# Patient Record
Sex: FEMALE | Race: WHITE | Employment: UNEMPLOYED | URBAN - METROPOLITAN AREA
[De-identification: names, ages, dates, MRNs, and addresses within clinical notes are randomized per-mention and may not be internally consistent; named-entity substitution may affect disease eponyms.]

---

## 2019-06-27 ENCOUNTER — HOSPITAL ENCOUNTER (OUTPATIENT)
Dept: VASCULAR LAB | Age: 49
Discharge: HOME | End: 2019-06-27
Payer: COMMERCIAL

## 2019-06-27 ENCOUNTER — APPOINTMENT (OUTPATIENT)
Dept: MAMMOGRAPHY | Age: 49
End: 2019-06-27
Payer: COMMERCIAL

## 2019-06-27 ENCOUNTER — HOSPITAL ENCOUNTER (OUTPATIENT)
Dept: MAMMOGRAPHY | Age: 49
Discharge: HOME | End: 2019-06-27
Payer: COMMERCIAL

## 2019-06-27 DIAGNOSIS — Z12.31 VISIT FOR SCREENING MAMMOGRAM: ICD-10-CM

## 2019-06-27 PROCEDURE — 77067 SCR MAMMO BI INCL CAD: CPT

## 2019-06-27 PROCEDURE — 93970 EXTREMITY STUDY: CPT

## 2020-12-07 ENCOUNTER — OFFICE VISIT (OUTPATIENT)
Dept: ORTHOPEDIC SURGERY | Age: 50
End: 2020-12-07
Payer: COMMERCIAL

## 2020-12-07 VITALS — BODY MASS INDEX: 27.32 KG/M2 | HEIGHT: 66 IN | TEMPERATURE: 97 F | WEIGHT: 170 LBS

## 2020-12-07 PROCEDURE — 99204 OFFICE O/P NEW MOD 45 MIN: CPT | Performed by: ORTHOPAEDIC SURGERY

## 2020-12-07 RX ORDER — KETOCONAZOLE 20 MG/G
CREAM TOPICAL
COMMUNITY
End: 2021-01-12 | Stop reason: ALTCHOICE

## 2020-12-07 RX ORDER — MINOCYCLINE HYDROCHLORIDE 50 MG/1
50 CAPSULE ORAL
COMMUNITY
End: 2021-10-25

## 2020-12-07 RX ORDER — DROSPIRENONE AND ETHINYL ESTRADIOL 0.02-3(28)
1 KIT ORAL DAILY
COMMUNITY
End: 2021-01-12 | Stop reason: ALTCHOICE

## 2020-12-07 RX ORDER — DROSPIRENONE AND ETHINYL ESTRADIOL 0.02-3(28)
1 KIT ORAL
COMMUNITY
End: 2021-01-12 | Stop reason: ALTCHOICE

## 2020-12-07 RX ORDER — MINOCYCLINE HYDROCHLORIDE 100 MG/1
CAPSULE ORAL
COMMUNITY
Start: 2020-09-19 | End: 2021-01-12 | Stop reason: ALTCHOICE

## 2020-12-07 RX ORDER — IBUPROFEN 400 MG/1
400 TABLET ORAL EVERY 6 HOURS PRN
COMMUNITY
Start: 2017-12-12

## 2020-12-07 RX ORDER — CEPHALEXIN 500 MG/1
CAPSULE ORAL
COMMUNITY
End: 2021-01-12 | Stop reason: ALTCHOICE

## 2020-12-07 RX ORDER — PROMETHAZINE HYDROCHLORIDE 25 MG/1
TABLET ORAL
COMMUNITY
End: 2021-01-12 | Stop reason: ALTCHOICE

## 2020-12-07 RX ORDER — MINOCYCLINE HYDROCHLORIDE 100 MG/1
CAPSULE ORAL
COMMUNITY
End: 2021-01-12 | Stop reason: ALTCHOICE

## 2020-12-08 ENCOUNTER — TELEPHONE (OUTPATIENT)
Dept: ORTHOPEDIC SURGERY | Age: 50
End: 2020-12-08

## 2020-12-11 ENCOUNTER — HOSPITAL ENCOUNTER (OUTPATIENT)
Dept: MRI IMAGING | Age: 50
Discharge: HOME | End: 2020-12-11
Payer: COMMERCIAL

## 2020-12-11 PROCEDURE — 73721 MRI JNT OF LWR EXTRE W/O DYE: CPT

## 2020-12-18 ENCOUNTER — OFFICE VISIT (OUTPATIENT)
Dept: ORTHOPEDIC SURGERY | Age: 50
End: 2020-12-18
Payer: COMMERCIAL

## 2020-12-18 VITALS — HEIGHT: 66 IN | WEIGHT: 160 LBS | BODY MASS INDEX: 25.71 KG/M2

## 2020-12-18 PROCEDURE — 99203 OFFICE O/P NEW LOW 30 MIN: CPT | Performed by: ORTHOPAEDIC SURGERY

## 2020-12-18 NOTE — PROGRESS NOTES
Chief Complaint    Knee Pain (left knee)      History of Present Illness:  Quin Mejía is a 52 y.o. female. She is here today for evaluation of her bilateral knees. She states she is got a longstanding history of bilateral knee pain and problems. She states she has had 2 surgeries that been arthroscopic on both of her knees in the past.  She recently was seen at [facility] and had x-rays done on her right knee and was diagnosed with osteoarthritis within her right knee. They were trying to get her approved for Visco injections for this right knee. Her left knee she recently did see Dr. Erika Mora and did get an MRI done on the left knee. It did demonstrate possible horizontal tear within the mid body of the lateral meniscus. She also did have some x-rays done in the left knee here today that did demonstrate some arthritis within the left knee. She states the right knee is the more bothersome of her 2 knees. Medical History:  Patient's medications, allergies, past medical, surgical, social and family histories were reviewed and updated as appropriate. Review of Systems:  Pertinent items are noted in HPI  Review of systems reviewed from Patient History Form dated on 12/18/20 and available in the patient's chart under the Media tab. Vital Signs:  Ht 5' 6\" (1.676 m)   Wt 160 lb (72.6 kg)   BMI 25.82 kg/m²     General Exam:   Constitutional: Patient is adequately groomed with no evidence of malnutrition  DTRs: Deep tendon reflexes are intact  Mental Status: The patient is oriented to time, place and person. The patient's mood and affect are appropriate. Knee Examination:    Inspection: She does have well-healed arthroscopic portals over the anterior aspect of the bilateral knees.   No significant swelling erythema noted Palpation: There is palpable crepitus over the bilateral knees through range of motion. No significant tenderness along the medial or lateral joint lines of bilateral knees. Range of Motion: Extension of the left knee is 0 degrees, knee flexion today is 125 degrees. Extension right knee 0 degrees with knee flexion of the right knee to 110 degrees. Strength: She is able to do a straight leg raise bilaterally. Special Tests: No instability to varus and valgus stress testing. Negative posterior drawer. Negative Apley Briana    Skin: There are no rashes, ulcerations or lesions. Gait: Normal      Additional Comments:       Additional Examinations:         Lower Back: Examination of the lower back does not show any tenderness, deformity or injury. Range of motion is unremarkable. There is no gross instability. There are no rashes, ulcerations or lesions. Strength and tone are normal.    Radiology:     X-rays obtained and reviewed in office:  Views 4 views left knee today does demonstrate tricompartmental degenerative change with joint space loss and osteophyte formation. No evidence of fracture dislocation        Assessment : Bilateral knee osteoarthritis    Impression:  Encounter Diagnoses   Name Primary?     Left knee pain, unspecified chronicity Yes    Primary osteoarthritis of both knees        Office Procedures:  Orders Placed This Encounter   Procedures    XR KNEE LEFT (MIN 4 VIEWS)     Standing Status:   Future     Number of Occurrences:   1     Standing Expiration Date:   12/18/2021    EUFLEXXA INJECTION (For Auth/Precert)     bilat knees     Standing Status:   Future     Standing Expiration Date:   12/18/2021 Treatment Plan: I discussed the diagnosis and treatment options with her today. Recommend at this time proceeding with Visco injections into the bilateral knees to treat her osteoarthritis. She is agreeable with this plan. We will submit for injections at this point time and contact her once those injections are approved. Also did discuss use of anti-inflammatories and she may use these as needed for symptom management. We also do have the option of cortisone injections in the future.   I will see her back when she is approved for her Visco injections

## 2020-12-21 ENCOUNTER — TELEPHONE (OUTPATIENT)
Dept: ORTHOPEDIC SURGERY | Age: 50
End: 2020-12-21

## 2021-01-04 ENCOUNTER — TELEPHONE (OUTPATIENT)
Dept: ORTHOPEDIC SURGERY | Age: 51
End: 2021-01-04

## 2021-01-04 ENCOUNTER — OFFICE VISIT (OUTPATIENT)
Dept: ORTHOPEDIC SURGERY | Age: 51
End: 2021-01-04
Payer: COMMERCIAL

## 2021-01-04 DIAGNOSIS — S83.207A ACUTE MENISCAL TEAR OF LEFT KNEE, INITIAL ENCOUNTER: ICD-10-CM

## 2021-01-04 DIAGNOSIS — M17.0 PRIMARY OSTEOARTHRITIS OF BOTH KNEES: Primary | ICD-10-CM

## 2021-01-04 PROCEDURE — 99213 OFFICE O/P EST LOW 20 MIN: CPT | Performed by: PHYSICIAN ASSISTANT

## 2021-01-04 NOTE — TELEPHONE ENCOUNTER
Patient having increase knee pain. Dr. Polo Magallon is in surgery today and not able to see her until next week. Patient was advised to go to the ER or to after hours.

## 2021-01-04 NOTE — PROGRESS NOTES
Subjective:      Patient ID: Miryam Tracey is a 52 y.o. female. Chief Complaint   Patient presents with    Pain     Left knee - been having some knee pain. Getting worse. Waiting on viscosupplementation auth. Has a meniscus tear and is waiting to schedule surgery. HPI:   She is here in the [address]   for an initial evaluation of her left knee pain. She was evaluated by Dr Cassie Reyes on 12/18/2020 for left knee pain. At that time it was felt that she had a small lateral meniscus tear and early degenerative arthritis of the left knee. Viscosupplementation injections were discussed. This past weekend her left knee symptoms worsened after playing tennis. She is now having difficulty extending her left knee. Her left knee pain is global in nature. She does not isolate her pain to the lateral aspect. Pain Scale 7/10 VAS. Location of pain global left knee. Pain is worse with activity. Pain improves with elevation. Previous treatments have included ice and NSAIDs with minimal improvement. She did call the office earlier today and was instructed to come to the after-hours clinic or ER for further evaluation of her left knee pain. She was told by staff that Dr. Cassie Reyes was not available to see her this week. She states she was also upset to find out Dr Cassie Reyes will be leaving the practice and would have to transfer to [facility]. Review Of Systems:   A 14 point review of systems and history form completed by the patient has been reviewed. This scanned in the media tab of the patient's chart under 12/7/2020 date. As outlined in the HPI. Negative for fever or chills. Positive for joint pain, swelling and stiffness. Negative for numbness or tingling. Past Medical History:   Diagnosis Date    Arthritis        No family history on file. No past surgical history on file.     Social History     Occupational History  Not on file   Tobacco Use    Smoking status: Never Smoker    Smokeless tobacco: Never Used   Substance and Sexual Activity    Alcohol use: Yes    Drug use: Never    Sexual activity: Not on file       Current Outpatient Medications   Medication Sig Dispense Refill    cephALEXin (KEFLEX) 500 MG capsule cephalexin 500 mg capsule      drospirenone-ethinyl estradiol (ALLIE) 3-0.02 MG per tablet Take 1 tablet by mouth daily      drospirenone-ethinyl estradiol (ALLIE) 3-0.02 MG per tablet Take 1 tablet by mouth daily      drospirenone-ethinyl estradiol (ALLIE) 3-0.02 MG per tablet Take 1 tablet by mouth      ibuprofen (ADVIL;MOTRIN) 400 MG tablet Take 400 mg by mouth      ketoconazole (NIZORAL) 2 % cream ketoconazole 2 % topical cream      minocycline (MINOCIN;DYNACIN) 50 MG capsule Take 50 mg by mouth      minocycline (MINOCIN;DYNACIN) 100 MG capsule minocycline 100 mg capsule      minocycline (MINOCIN;DYNACIN) 100 MG capsule       promethazine (PHENERGAN) 25 MG tablet promethazine 25 mg tablet       No current facility-administered medications for this visit. Objective:     She is alert, oriented x 3, pleasant, well nourished, developed and in no   acute distress. There were no vitals taken for this visit. Examination of the left knee: Inspection of the skin there is multiple well-healed incisions. No erythema. .  Inspection of the soft tissues no soft tissue swelling. The overall alignment of the knee is neutral.  Gait is antalgic. There is a mild intra-articular effusion. AROM:           PROM:  Extension-        20                   20  Flexion -           100                   100  There moderate pain associated with ROM testing. Medial joint line is tender to palpation. Lateral joint line is tender to palpation. Pes anserine bursa is not tender to palpation. Patellar tendon is not tender to palpation. Quadriceps tendon is not tender to palpation. Collateral ligaments is not tender to palpation. Popliteal fossa is tender to palpation. Varus Stress testing negative for pain or crepitus. Valgus Stress testing negative for pain or crepitus. Lachman's test negative for  ACL laxity. Anterior Drawer test negative for ACL laxity. Posterior Drawer test negative for PCL laxity. Briana's Test negative for meniscus tear. Patellar Compression testing positive for pain or crepitus. Extensor Mechanism is intact. Examination of the lower extremities are intact with sensation to light touch. Motor testing  5/5 in all major motor groups of the lower extremities. Negative Quach's Sign. SLR negative. Examination of the lower extremities shows intact perfusion to all extremities. No cyanosis. Digits are warm to touch, capillary refill is less than 2 seconds. There is no edema noted. Examination of the skin over both lower extremities reveals: The skin to be intact without lacerations or abrasions. No significant erythema. No significant rashes or skin lesions. X Rays: not performed in the office today:   X-rays reviewed from her office visit 12/18/2020 shows moderate degenerative changes of the medial and patellofemoral compartment  Additional Tests reviewed:   MRI: Obtained from [facility] or an outside facility.   Narrative   EXAMINATION:   MRI OF THE LEFT KNEE WITHOUT CONTRAST, 12/11/2020 10:07 am       TECHNIQUE:   Multiplanar multisequence MRI of the left knee was performed without the   administration of intravenous contrast.       COMPARISON:   None.       HISTORY:   ORDERING SYSTEM PROVIDED HISTORY: Acute meniscal tear of left knee, initial   encounter   TECHNOLOGIST PROVIDED HISTORY:   Reason for exam:->MRI L KNEE W/3D  R/O MENISCUS TEAR   Reason for exam:->IBAN  PUSH TO [facility] PACS   Is the patient pregnant?->No   Reason for Exam: LT knee pain.  No known injury       FINDINGS: MENISCI: No medial meniscus tear identified. Malu Hives is a questionable   horizontal tear of the lateral meniscus body extending to the anterior horn.       CRUCIATE LIGAMENTS: ACL and PCL are intact.       EXTENSOR MECHANISM: Patellar and distal quadriceps tendons are intact.  The   medial and lateral patellar retinaculum are intact.       LATERAL COLLATERAL LIGAMENT COMPLEX: Iliotibial band, fibular collateral   ligament, and biceps femoris tendon are intact.       MEDIAL COLLATERAL LIGAMENT COMPLEX: MCL is intact.       KNEE JOINT: Small knee joint effusion.  Tiny Baker's cyst.  No full-thickness   cartilage defect identified. Malu Hives is a partial-thickness cartilage fissure   at the lateral femoral condyle.       BONE MARROW: No acute fracture or significant bone marrow edema.           Impression   Questionable horizontal tear of the lateral meniscus body extending to the   anterior horn.  No medial meniscus tear identified.       Cruciate and collateral ligaments are intact.       Small knee joint effusion.               Additional Outside Records reviewed: None    Diagnosis:       ICD-10-CM    1. Primary osteoarthritis of both knees  M17.0    2. Acute meniscal tear of left knee, initial encounter  S83.207A         Assessment and Plan:       Assessment:  Left knee pain due to a combination of a lateral meniscus tear and degenerative arthritis of the left knee. 25 minutes was the total time spent on today's visit  including reviewing test results or histories in preparation for the visit, physical exam, time spent on documentation and ordering prescriptions, tests and procedures after the visit. Plan:  1. Medications-OTC NSAIDs and acetaminophen. 2. PT-home exercise program for range of motion. Work on extension. .    3. Further Imaging-none indicated. 4. Procedures-request viscosupplementation injections/Euflexxa for the left knee. Deyanira Kemp 5. Follow up-she wishes to change physicians who will remain a [address] Dr. Benitez Lafleur recommended today. Call or return to clinic if these symptoms worsen or fail to improve as anticipated.

## 2021-01-05 ENCOUNTER — TELEPHONE (OUTPATIENT)
Dept: ORTHOPEDIC SURGERY | Age: 51
End: 2021-01-05

## 2021-01-05 NOTE — TELEPHONE ENCOUNTER
1/05/2021  EUFLEXXA  (SERIES OF 3)    BILATERAL KNEES. DENIED. DOES NOT MEET CLINICAL POLICY PER FAX FROM [employer].   SUBMITTED BY BEVERLY WILCOX  (DENIAL SCANNED UNDER MEDIA TAB)

## 2021-01-05 NOTE — TELEPHONE ENCOUNTER
Spoke with patient, the Euflexxa injections were denied by her insurance company. The denial letter under media explains the next steps of what she would need to do. She does not want to do physical therapy, she stated it will not help. I scheduled her an appointment for cortisone injections into bilat knees. She is currently taking Advil, she will talk to Dr. Jerome Harris at the appointment about a different anti inflammatory. Cheryl A. Satira Felty

## 2021-01-12 ENCOUNTER — OFFICE VISIT (OUTPATIENT)
Dept: PRIMARY CARE CLINIC | Age: 51
End: 2021-01-12
Payer: COMMERCIAL

## 2021-01-12 DIAGNOSIS — Z20.822 SUSPECTED COVID-19 VIRUS INFECTION: Primary | ICD-10-CM

## 2021-01-12 LAB — SARS-COV-2: NOT DETECTED

## 2021-01-12 PROCEDURE — 99211 OFF/OP EST MAY X REQ PHY/QHP: CPT | Performed by: NURSE PRACTITIONER

## 2021-01-19 ENCOUNTER — HOSPITAL ENCOUNTER (OUTPATIENT)
Age: 51
Setting detail: OUTPATIENT SURGERY
Discharge: HOME | End: 2021-01-19
Attending: ORTHOPAEDIC SURGERY | Admitting: ORTHOPAEDIC SURGERY
Payer: COMMERCIAL

## 2021-01-19 ENCOUNTER — ANESTHESIA EVENT (OUTPATIENT)
Dept: OPERATING ROOM | Age: 51
End: 2021-01-19
Payer: COMMERCIAL

## 2021-01-19 ENCOUNTER — ANESTHESIA (OUTPATIENT)
Dept: OPERATING ROOM | Age: 51
End: 2021-01-19
Payer: COMMERCIAL

## 2021-01-19 VITALS
BODY MASS INDEX: 27.13 KG/M2 | DIASTOLIC BLOOD PRESSURE: 84 MMHG | SYSTOLIC BLOOD PRESSURE: 140 MMHG | TEMPERATURE: 96.8 F | HEART RATE: 74 BPM | RESPIRATION RATE: 12 BRPM | OXYGEN SATURATION: 100 % | WEIGHT: 168.8 LBS | HEIGHT: 66 IN

## 2021-01-19 VITALS
TEMPERATURE: 96.6 F | SYSTOLIC BLOOD PRESSURE: 93 MMHG | OXYGEN SATURATION: 100 % | RESPIRATION RATE: 5 BRPM | DIASTOLIC BLOOD PRESSURE: 54 MMHG

## 2021-01-19 LAB
GLUCOSE BLD-MCNC: 89 MG/DL (ref 70–99)
PERFORMED ON: NORMAL

## 2021-01-19 PROCEDURE — 3600000014 HC SURGERY LEVEL 4 ADDTL 15MIN: Performed by: ORTHOPAEDIC SURGERY

## 2021-01-19 PROCEDURE — 7100000011 HC PHASE II RECOVERY - ADDTL 15 MIN: Performed by: ORTHOPAEDIC SURGERY

## 2021-01-19 PROCEDURE — 2709999900 HC NON-CHARGEABLE SUPPLY: Performed by: ORTHOPAEDIC SURGERY

## 2021-01-19 PROCEDURE — 7100000000 HC PACU RECOVERY - FIRST 15 MIN: Performed by: ORTHOPAEDIC SURGERY

## 2021-01-19 PROCEDURE — 7100000001 HC PACU RECOVERY - ADDTL 15 MIN: Performed by: ORTHOPAEDIC SURGERY

## 2021-01-19 PROCEDURE — 6370000000 HC RX 637 (ALT 250 FOR IP): Performed by: ANESTHESIOLOGY

## 2021-01-19 PROCEDURE — 6360000002 HC RX W HCPCS: Performed by: ORTHOPAEDIC SURGERY

## 2021-01-19 PROCEDURE — 3700000000 HC ANESTHESIA ATTENDED CARE: Performed by: ORTHOPAEDIC SURGERY

## 2021-01-19 PROCEDURE — 6360000002 HC RX W HCPCS: Performed by: NURSE ANESTHETIST, CERTIFIED REGISTERED

## 2021-01-19 PROCEDURE — 3600000004 HC SURGERY LEVEL 4 BASE: Performed by: ORTHOPAEDIC SURGERY

## 2021-01-19 PROCEDURE — 7100000010 HC PHASE II RECOVERY - FIRST 15 MIN: Performed by: ORTHOPAEDIC SURGERY

## 2021-01-19 PROCEDURE — 2720000010 HC SURG SUPPLY STERILE: Performed by: ORTHOPAEDIC SURGERY

## 2021-01-19 PROCEDURE — 2500000003 HC RX 250 WO HCPCS: Performed by: NURSE ANESTHETIST, CERTIFIED REGISTERED

## 2021-01-19 PROCEDURE — 3700000001 HC ADD 15 MINUTES (ANESTHESIA): Performed by: ORTHOPAEDIC SURGERY

## 2021-01-19 PROCEDURE — 2580000003 HC RX 258: Performed by: ORTHOPAEDIC SURGERY

## 2021-01-19 PROCEDURE — 6360000002 HC RX W HCPCS: Performed by: ANESTHESIOLOGY

## 2021-01-19 RX ORDER — ROPIVACAINE HYDROCHLORIDE 5 MG/ML
INJECTION, SOLUTION EPIDURAL; INFILTRATION; PERINEURAL
Status: COMPLETED | OUTPATIENT
Start: 2021-01-19 | End: 2021-01-19

## 2021-01-19 RX ORDER — LIDOCAINE HYDROCHLORIDE 20 MG/ML
INJECTION, SOLUTION EPIDURAL; INFILTRATION; INTRACAUDAL; PERINEURAL PRN
Status: DISCONTINUED | OUTPATIENT
Start: 2021-01-19 | End: 2021-01-19 | Stop reason: SDUPTHER

## 2021-01-19 RX ORDER — GLYCOPYRROLATE 1 MG/5 ML
SYRINGE (ML) INTRAVENOUS PRN
Status: DISCONTINUED | OUTPATIENT
Start: 2021-01-19 | End: 2021-01-19 | Stop reason: SDUPTHER

## 2021-01-19 RX ORDER — SODIUM CHLORIDE 0.9 % (FLUSH) 0.9 %
10 SYRINGE (ML) INJECTION PRN
Status: CANCELLED | OUTPATIENT
Start: 2021-01-19

## 2021-01-19 RX ORDER — DEXAMETHASONE SODIUM PHOSPHATE 4 MG/ML
INJECTION, SOLUTION INTRA-ARTICULAR; INTRALESIONAL; INTRAMUSCULAR; INTRAVENOUS; SOFT TISSUE PRN
Status: DISCONTINUED | OUTPATIENT
Start: 2021-01-19 | End: 2021-01-19 | Stop reason: SDUPTHER

## 2021-01-19 RX ORDER — ONDANSETRON 2 MG/ML
INJECTION INTRAMUSCULAR; INTRAVENOUS PRN
Status: DISCONTINUED | OUTPATIENT
Start: 2021-01-19 | End: 2021-01-19 | Stop reason: SDUPTHER

## 2021-01-19 RX ORDER — HYDRALAZINE HYDROCHLORIDE 20 MG/ML
5 INJECTION INTRAMUSCULAR; INTRAVENOUS EVERY 10 MIN PRN
Status: DISCONTINUED | OUTPATIENT
Start: 2021-01-19 | End: 2021-01-19 | Stop reason: HOSPADM

## 2021-01-19 RX ORDER — SODIUM CHLORIDE, SODIUM LACTATE, POTASSIUM CHLORIDE, CALCIUM CHLORIDE 600; 310; 30; 20 MG/100ML; MG/100ML; MG/100ML; MG/100ML
INJECTION, SOLUTION INTRAVENOUS CONTINUOUS
Status: DISCONTINUED | OUTPATIENT
Start: 2021-01-19 | End: 2021-01-19 | Stop reason: HOSPADM

## 2021-01-19 RX ORDER — LIDOCAINE HYDROCHLORIDE 10 MG/ML
1 INJECTION, SOLUTION EPIDURAL; INFILTRATION; INTRACAUDAL; PERINEURAL
Status: CANCELLED | OUTPATIENT
Start: 2021-01-19 | End: 2021-01-19

## 2021-01-19 RX ORDER — PROPOFOL 10 MG/ML
INJECTION, EMULSION INTRAVENOUS PRN
Status: DISCONTINUED | OUTPATIENT
Start: 2021-01-19 | End: 2021-01-19 | Stop reason: SDUPTHER

## 2021-01-19 RX ORDER — APREPITANT 40 MG/1
40 CAPSULE ORAL ONCE
Status: COMPLETED | OUTPATIENT
Start: 2021-01-19 | End: 2021-01-19

## 2021-01-19 RX ORDER — SODIUM CHLORIDE 0.9 % (FLUSH) 0.9 %
10 SYRINGE (ML) INJECTION EVERY 12 HOURS SCHEDULED
Status: CANCELLED | OUTPATIENT
Start: 2021-01-19

## 2021-01-19 RX ORDER — LIDOCAINE HYDROCHLORIDE 10 MG/ML
0.5 INJECTION, SOLUTION EPIDURAL; INFILTRATION; INTRACAUDAL; PERINEURAL ONCE
Status: DISCONTINUED | OUTPATIENT
Start: 2021-01-19 | End: 2021-01-19 | Stop reason: HOSPADM

## 2021-01-19 RX ORDER — ONDANSETRON 2 MG/ML
4 INJECTION INTRAMUSCULAR; INTRAVENOUS
Status: DISCONTINUED | OUTPATIENT
Start: 2021-01-19 | End: 2021-01-19 | Stop reason: HOSPADM

## 2021-01-19 RX ORDER — MEPERIDINE HYDROCHLORIDE 25 MG/ML
12.5 INJECTION INTRAMUSCULAR; INTRAVENOUS; SUBCUTANEOUS EVERY 5 MIN PRN
Status: DISCONTINUED | OUTPATIENT
Start: 2021-01-19 | End: 2021-01-19 | Stop reason: HOSPADM

## 2021-01-19 RX ORDER — DEXAMETHASONE SODIUM PHOSPHATE 4 MG/ML
INJECTION, SOLUTION INTRA-ARTICULAR; INTRALESIONAL; INTRAMUSCULAR; INTRAVENOUS; SOFT TISSUE
Status: COMPLETED | OUTPATIENT
Start: 2021-01-19 | End: 2021-01-19

## 2021-01-19 RX ORDER — SCOLOPAMINE TRANSDERMAL SYSTEM 1 MG/1
1 PATCH, EXTENDED RELEASE TRANSDERMAL ONCE
Status: DISCONTINUED | OUTPATIENT
Start: 2021-01-19 | End: 2021-01-19 | Stop reason: HOSPADM

## 2021-01-19 RX ORDER — MAGNESIUM HYDROXIDE 1200 MG/15ML
LIQUID ORAL CONTINUOUS PRN
Status: COMPLETED | OUTPATIENT
Start: 2021-01-19 | End: 2021-01-19

## 2021-01-19 RX ORDER — SODIUM CHLORIDE, SODIUM LACTATE, POTASSIUM CHLORIDE, CALCIUM CHLORIDE 600; 310; 30; 20 MG/100ML; MG/100ML; MG/100ML; MG/100ML
INJECTION, SOLUTION INTRAVENOUS CONTINUOUS
Status: CANCELLED | OUTPATIENT
Start: 2021-01-19

## 2021-01-19 RX ORDER — FENTANYL CITRATE 50 UG/ML
INJECTION, SOLUTION INTRAMUSCULAR; INTRAVENOUS PRN
Status: DISCONTINUED | OUTPATIENT
Start: 2021-01-19 | End: 2021-01-19 | Stop reason: SDUPTHER

## 2021-01-19 RX ORDER — LABETALOL HYDROCHLORIDE 5 MG/ML
5 INJECTION, SOLUTION INTRAVENOUS EVERY 10 MIN PRN
Status: DISCONTINUED | OUTPATIENT
Start: 2021-01-19 | End: 2021-01-19 | Stop reason: HOSPADM

## 2021-01-19 RX ORDER — OXYCODONE HYDROCHLORIDE AND ACETAMINOPHEN 5; 325 MG/1; MG/1
1 TABLET ORAL
Status: DISCONTINUED | OUTPATIENT
Start: 2021-01-19 | End: 2021-01-19 | Stop reason: HOSPADM

## 2021-01-19 RX ORDER — KETOROLAC TROMETHAMINE 30 MG/ML
INJECTION, SOLUTION INTRAMUSCULAR; INTRAVENOUS PRN
Status: DISCONTINUED | OUTPATIENT
Start: 2021-01-19 | End: 2021-01-19 | Stop reason: SDUPTHER

## 2021-01-19 RX ORDER — HYDROMORPHONE HCL 110MG/55ML
0.5 PATIENT CONTROLLED ANALGESIA SYRINGE INTRAVENOUS EVERY 5 MIN PRN
Status: DISCONTINUED | OUTPATIENT
Start: 2021-01-19 | End: 2021-01-19 | Stop reason: HOSPADM

## 2021-01-19 RX ADMIN — ONDANSETRON 4 MG: 2 INJECTION INTRAMUSCULAR; INTRAVENOUS at 16:47

## 2021-01-19 RX ADMIN — LIDOCAINE HYDROCHLORIDE 60 MG: 20 INJECTION, SOLUTION EPIDURAL; INFILTRATION; INTRACAUDAL; PERINEURAL at 16:29

## 2021-01-19 RX ADMIN — DEXAMETHASONE SODIUM PHOSPHATE 4 MG: 4 INJECTION, SOLUTION INTRAMUSCULAR; INTRAVENOUS at 16:47

## 2021-01-19 RX ADMIN — FENTANYL CITRATE 50 MCG: 50 INJECTION INTRAMUSCULAR; INTRAVENOUS at 16:42

## 2021-01-19 RX ADMIN — PROPOFOL 180 MG: 10 INJECTION, EMULSION INTRAVENOUS at 16:29

## 2021-01-19 RX ADMIN — CEFAZOLIN SODIUM 2 G: 10 INJECTION, POWDER, FOR SOLUTION INTRAVENOUS at 16:15

## 2021-01-19 RX ADMIN — SODIUM CHLORIDE, POTASSIUM CHLORIDE, SODIUM LACTATE AND CALCIUM CHLORIDE: 600; 310; 30; 20 INJECTION, SOLUTION INTRAVENOUS at 16:23

## 2021-01-19 RX ADMIN — FENTANYL CITRATE 25 MCG: 50 INJECTION INTRAMUSCULAR; INTRAVENOUS at 16:52

## 2021-01-19 RX ADMIN — SODIUM CHLORIDE, POTASSIUM CHLORIDE, SODIUM LACTATE AND CALCIUM CHLORIDE: 600; 310; 30; 20 INJECTION, SOLUTION INTRAVENOUS at 16:05

## 2021-01-19 RX ADMIN — KETOROLAC TROMETHAMINE 30 MG: 60 INJECTION, SOLUTION INTRAMUSCULAR at 17:21

## 2021-01-19 RX ADMIN — APREPITANT 40 MG: 40 CAPSULE ORAL at 15:58

## 2021-01-19 RX ADMIN — Medication 0.2 MG: at 16:29

## 2021-01-19 RX ADMIN — FENTANYL CITRATE 50 MCG: 50 INJECTION INTRAMUSCULAR; INTRAVENOUS at 16:29

## 2021-01-19 RX ADMIN — Medication 0.1 MG: at 16:37

## 2021-01-19 ASSESSMENT — PULMONARY FUNCTION TESTS
PIF_VALUE: 17
PIF_VALUE: 17
PIF_VALUE: 16
PIF_VALUE: 15
PIF_VALUE: 16
PIF_VALUE: 15
PIF_VALUE: 16
PIF_VALUE: 16
PIF_VALUE: 2
PIF_VALUE: 16
PIF_VALUE: 2
PIF_VALUE: 2
PIF_VALUE: 16
PIF_VALUE: 2
PIF_VALUE: 16
PIF_VALUE: 1
PIF_VALUE: 16
PIF_VALUE: 14
PIF_VALUE: 16
PIF_VALUE: 2
PIF_VALUE: 16
PIF_VALUE: 17
PIF_VALUE: 16
PIF_VALUE: 1
PIF_VALUE: 17
PIF_VALUE: 16

## 2021-01-19 ASSESSMENT — LIFESTYLE VARIABLES: SMOKING_STATUS: 0

## 2021-01-19 NOTE — PROGRESS NOTES
Discharge instructions completed with patient and friend. IV removed.  Patient wheeled to car per RN UCG/BMP/CBC

## 2021-01-19 NOTE — H&P
Update History & Physical    The patient's History and Physical of January 1, 2021 was reviewed with the patient and I examined the patient. There was no change. The surgical site was confirmed by the patient and me. Plan: The risks, benefits, expected outcome, and alternative to the recommended procedure have been discussed with the patient. Patient understands and wants to proceed with the procedure.      Electronically signed by Juhi Kim PA-C on 1/19/2021 at 4:19 PM   Via Dr. Sana Jeffers

## 2021-01-19 NOTE — PROGRESS NOTES
Patient received to PACU in stable condition. VSS. No signs of distress. Dressing to knee clean dry and intact. Ice applied. Denies pain. Will continue to monitor.

## 2021-01-19 NOTE — ANESTHESIA PRE PROCEDURE
Department of Anesthesiology  Preprocedure Note       Name:  Alisha Tyler   Age:  52 y.o.  :  1970                                          MRN:  [id number]         Date:  2021      Surgeon: Gary Brown):  Nito Umana MD    Procedure: Procedure(s):  LEFT KNEE ARTHROSCOPIC LATERAL MENISCECTOMY    Medications prior to admission:   Prior to Admission medications    Medication Sig Start Date End Date Taking?  Authorizing Provider   ibuprofen (ADVIL;MOTRIN) 400 MG tablet Take 400 mg by mouth 17   Historical Provider, MD   minocycline (MINOCIN;DYNACIN) 50 MG capsule Take 50 mg by mouth AS NEEDED FOR ACNE    Historical Provider, MD       Current medications:    Current Facility-Administered Medications   Medication Dose Route Frequency Provider Last Rate Last Admin    lactated ringers infusion   Intravenous Continuous Nito Umana MD        lidocaine PF 1 % injection 0.5 mL  0.5 mL Intradermal Once Nito Umana MD        ceFAZolin (ANCEF) 2 g in dextrose 5 % 100 mL IVPB  2 g Intravenous On Call to Fidenciochristian Khoury MD        meperidine (DEMEROL) injection 12.5 mg  12.5 mg Intravenous Q5 Min PRN Jerica Nash MD        HYDROmorphone (DILAUDID) injection 0.5 mg  0.5 mg Intravenous Q5 Min PRN Jerica Nash MD        oxyCODONE-acetaminophen (PERCOCET) 5-325 MG per tablet 1 tablet  1 tablet Oral Once PRN Jerica Nash MD        ondansetron [facility]) injection 4 mg  4 mg Intravenous Once PRN Jerica Nash MD        labetalol (NORMODYNE;TRANDATE) injection 5 mg  5 mg Intravenous Q10 Min PRN Jerica Nash MD        hydrALAZINE (APRESOLINE) injection 5 mg  5 mg Intravenous Q10 Min PRN Jerica Nash MD           Allergies:  No Known Allergies    Problem List:    Patient Active Problem List   Diagnosis Code    Acquired breast deformity N64.89    Allergic rhinitis J30.9    HPV test positive [id number]    Micromastia N64.82    Plantar fascia rupture Q83.220F    Plantar fascial fibromatosis M72.2    S/P LEEP Z98.890    Acute meniscal tear of left knee S83.207A       Past Medical History:        Diagnosis Date    Arthritis     PONV (postoperative nausea and vomiting)        Past Surgical History:        Procedure Laterality Date    BREAST ENHANCEMENT SURGERY       SECTION      KNEE ARTHROSCOPY Bilateral     LEEP      OTHER SURGICAL HISTORY      BREAST IMPLANT REMOVAL       Social History:    Social History     Tobacco Use    Smoking status: Never Smoker    Smokeless tobacco: Never Used   Substance Use Topics    Alcohol use: Yes     Comment: 0-2 DRINKS PER WEEK                                Counseling given: Not Answered      Vital Signs (Current):   Vitals:    01/15/21 1124 21 1540   BP:  (!) 143/89   Pulse:  66   Resp:  20   Temp:  99.1 °F (37.3 °C)   TempSrc:  Temporal   SpO2:  98%   Weight: 160 lb (72.6 kg) 168 lb 12.8 oz (76.6 kg)   Height: 5' 6\" (1.676 m)                                               BP Readings from Last 3 Encounters:   21 (!) 143/89       NPO Status: Time of last liquid consumption: 0000                        Time of last solid consumption: 0000                        Date of last liquid consumption: 21                        Date of last solid food consumption: 21    BMI:   Wt Readings from Last 3 Encounters:   21 168 lb 12.8 oz (76.6 kg)   20 160 lb (72.6 kg)   20 170 lb (77.1 kg)     Body mass index is 27.25 kg/m². CBC: No results found for: WBC, RBC, HGB, HCT, MCV, RDW, PLT    CMP: No results found for: NA, K, CL, CO2, BUN, CREATININE, GFRAA, AGRATIO, LABGLOM, GLUCOSE, PROT, CALCIUM, BILITOT, ALKPHOS, AST, ALT    POC Tests: No results for input(s): POCGLU, POCNA, POCK, POCCL, POCBUN, POCHEMO, POCHCT in the last 72 hours.     Coags: No results found for: PROTIME, INR, APTT    HCG (If Applicable): No results found for: PREGTESTUR, PREGSERUM, HCG, HCGQUANT     ABGs: No results found for: PHART, PO2ART, MRK8OKX, HMV4UTS, BEART, V8FHUGLI     Type & Screen (If Applicable):  No results found for: LABABO, LABRH    Drug/Infectious Status (If Applicable):  No results found for: HIV, HEPCAB    COVID-19 Screening (If Applicable):   Lab Results   Component Value Date    COVID19 Not Detected 01/12/2021         Anesthesia Evaluation  Patient summary reviewed and Nursing notes reviewed   history of anesthetic complications: PONV. Airway: Mallampati: II  TM distance: >3 FB   Neck ROM: full  Mouth opening: > = 3 FB Dental: normal exam         Pulmonary:Negative Pulmonary ROS and normal exam  breath sounds clear to auscultation      (-) COPD, asthma, sleep apnea and not a current smoker                           Cardiovascular:Negative CV ROS  Exercise tolerance: good (>4 METS),       (-) hypertension, past MI, CAD, CABG/stent, dysrhythmias,  angina and  CHF      Rhythm: regular  Rate: normal                    Neuro/Psych:   Negative Neuro/Psych ROS     (-) seizures, TIA and CVA           GI/Hepatic/Renal: Neg GI/Hepatic/Renal ROS       (-) GERD, liver disease and no renal disease       Endo/Other: Negative Endo/Other ROS       (-) diabetes mellitus, hypothyroidism, hyperthyroidism               Abdominal:           Vascular:                                        Anesthesia Plan      general     ASA 1       Induction: intravenous. MIPS: Postoperative opioids intended and Prophylactic antiemetics administered. Anesthetic plan and risks discussed with patient. Plan discussed with CRNA.                   Radu Hauser MD   1/19/2021

## 2021-01-19 NOTE — ANESTHESIA POSTPROCEDURE EVALUATION
Department of Anesthesiology  Postprocedure Note    Patient: Maria De Jesus Corcoran  MRN: [id number]  YOB: 1970  Date of evaluation: 1/19/2021  Time:  6:01 PM     Procedure Summary     Date: 01/19/21 Room / Location: [address]    Anesthesia Start: 0657 Anesthesia Stop: 9805    Procedure: LEFT KNEE ARTHROSCOPIC LATERAL MENISCECTOMY PARTIAL MEDIAL, PARTIAL LATERAL, CHORDROPLASTY MEDIAL LATERAL PATELLOFEMORAL CHORDROPLASTY (Left ) Diagnosis: (S62.865J  LEFT KNEE LATERAL MENISCAL TEAR)    Surgeons: Larna Mohs, MD Responsible Provider: Remy Hess MD    Anesthesia Type: general ASA Status: 1          Anesthesia Type: general    Kelli Phase I: Kelli Score: 8    Kelli Phase II:      Last vitals: Reviewed and per EMR flowsheets.        Anesthesia Post Evaluation    Patient location during evaluation: PACU  Patient participation: complete - patient participated  Level of consciousness: awake and alert  Airway patency: patent  Nausea & Vomiting: no vomiting and no nausea  Complications: no  Cardiovascular status: hemodynamically stable  Respiratory status: acceptable  Hydration status: stable

## 2021-01-19 NOTE — BRIEF OP NOTE
Brief Postoperative Note      Patient: Leslie Bradley  YOB: 1970  MRN: [id number]    Date of Procedure: 1/19/2021    Pre-Op Diagnosis: S83.282A  LEFT KNEE LATERAL MENISCAL TEAR    Post-Op Diagnosis: medial and lateral meniscal tear, c-mal mfc, lfc, trochlea       Procedure(s):  LEFT KNEE ARTHROSCOPIC LATERAL MENISCECTOMY PARTIA MEDIAL, PARTIAL LATERAL, CONDOPALSTY MEDIAL LATERAL TELELATERAL    Surgeon(s):  Moris Jackson MD    Assistant:  Surgical Assistant: Stephany Rapp    Anesthesia: General    Estimated Blood Loss (mL): less than 50     Complications: None    Specimens:   * No specimens in log *    Implants:  * No implants in log *      Drains: * No LDAs found *    Findings: as above      Electronically signed by Moris Jackson MD on 1/19/2021 at 5:24 PM

## 2021-01-20 NOTE — OP NOTE
[facility]                     350 Located within [facility], [address] [facility]                                OPERATIVE REPORT    PATIENT NAME: Jessica Moore                    :        1970  MED REC NO:   [id number]                          ROOM:  ACCOUNT NO:   [de-identified]                           ADMIT DATE: 2021  PROVIDER:     Mar Brown MD    DATE OF PROCEDURE:  2021    LOCATION:  Outpatient Surgery at [address] [place] [address]. PREOPERATIVE DIAGNOSIS:  Left knee lateral meniscus tear. POSTOPERATIVE DIAGNOSES:  1. Left knee medial and lateral meniscus tears. 2.  Left knee chondromalacia, medial femoral condyle, lateral femoral  condyle, trochlea. OPERATION PERFORMED:  1. Left knee arthroscopic partial lateral and medial meniscectomy. 2.  Left knee arthroscopic chondroplasty, medial femoral condyle,  lateral femoral condyle, trochlea. SURGEON:  Grey Whitney. Jeffrey Zaragoza MD    ASSISTANT:  Jah Guzmán PA-C    ANESTHESIA:  General.    INDICATIONS:  The patient is a 47-year-old female who is active with  exercise, golf, and tennis. She has had ongoing catching and locking  symptoms for almost a year now. She has failed conservative measures of  treatment to include activity modifications and injection therapy. She  has an MRI demonstrating a horizontal tear of the lateral meniscus that  was seen approximately a month ago. She presents now for surgery. OPERATIVE FINDINGS:  Patellofemoral joint extensive trochlear  chondromalacia grade 2 over the entire surface with grade 2 and grade 3  chondromalacia on the patella central and superior region, extensive  synovitis in the suprapatellar pouch. Medial compartment had a radial  tear of the posterior horn of the medial meniscus. There was a focal  area of grade 3 chondromalacia involving 60% of the weightbearing  surface of the medial femoral condyle. The ACL and PCL were intact.    There was a horizontal tear of the posterior horn and midbody region of  the lateral meniscus with edge tearing of the anterior horn of the  lateral meniscus, small area of grade 2 chondromalacia on the lateral  femoral condyle involving 20% of the weightbearing surface. OPERATIVE DETAILS:  The patient was put under general anesthetic,  positioned for a routine knee arthroscopy with a tourniquet and a leg  vincent and ____the foot of table was_ dropped. The left knee was prepared. Tourniquet was  inflated after sterile prep and drape was performed. Time-out was  performed. Two standard portals were made in the anterior knee next to the patellar  tendon with #11 blade scalpel where two prior scars were located from  prior arthroscopic surgery years ago. The arthroscope was introduced  into the knee and the pump was set at 40 mmHg. Through the anterior  medial portal, I performed a debridement of the posterior horn of the  medial meniscus as well as chondroplasty of the medial femoral condyle. This was done with the straight and angled biting instrument followed by  a motorized shaver. Additionally, I used the Kathe Sweeney 50-degree  RF probe to perform the chondroplasty of the smooth unstable fragments  of cartilaginous edge at the area of the chondromalacia. I then moved  to the lateral compartment and identified the lateral meniscus tear and  debrided this with biting instruments and the motorized shaver. I  switched the portal positions with the scope coming from the medial and  the instruments coming from the lateral to better access the posterior  horn of the lateral meniscus. I did a chondroplasty once again in the  lateral femoral condyle with the Alvarez [employer] Nephew 50-degree probe. I  then did an extensive chondroplasty on the anterior surface of the femur  as well as the undersurface of the patella with the motorized shaver and  the 50-degree RF probe again. A synovectomy was performed.   A spinal  needle was placed in the suprapatellar pouch and 1 mL of Kenalog was  placed followed by 30 mL of Naropin. Portals were closed in the routine  fashion with 4-0 Monocryl in the subcutaneous layer and a Steri-Strips  over the two portals. Dressings were applied with gauze, cast padding,  ABD pads, and an Ace wrap. Tourniquet was deflated. The patient  tolerated the procedure well.         Bushra Beatty MD    D: 01/19/2021 17:40:55       T: 01/20/2021 0:47:09     [id number]  Job#: [id number]     Doc#: [id number]    CC:

## 2021-07-27 ENCOUNTER — HOSPITAL ENCOUNTER (OUTPATIENT)
Dept: VASCULAR LAB | Age: 51
Discharge: HOME | End: 2021-07-27
Payer: COMMERCIAL

## 2021-07-27 DIAGNOSIS — M79.89 SWELLING OF LIMB: ICD-10-CM

## 2021-07-27 PROCEDURE — 93971 EXTREMITY STUDY: CPT

## 2021-09-18 ENCOUNTER — HOSPITAL ENCOUNTER (OUTPATIENT)
Dept: MRI IMAGING | Age: 51
Discharge: HOME | End: 2021-09-18
Payer: COMMERCIAL

## 2021-09-18 DIAGNOSIS — M25.562 LEFT KNEE PAIN, UNSPECIFIED CHRONICITY: ICD-10-CM

## 2021-09-18 DIAGNOSIS — M17.12 PRIMARY OSTEOARTHRITIS OF LEFT KNEE: ICD-10-CM

## 2021-09-18 PROCEDURE — 73721 MRI JNT OF LWR EXTRE W/O DYE: CPT

## 2021-10-21 NOTE — PROGRESS NOTES
Cha Hilary    Age 54 y.o.    female    1970    MRN [id number]    10/29/2021  Arrival Time_____________  OR Time____________75 [address]     Procedure(s):  VIDEO ARTHROSCOPY LEFT KNEE, PARTIAL MEDIAL/LATERAL MENISCECTOMY, CHONDROPLASTY, FAT PAD EXCISION                      General    Surgeon(s):  Elsie Cota, MD       Phone [phone] ([place])     Initals  Date  [address]  Cell         Work  _____________________________________________________________________  _____________________________________________________________________  _____________________________________________________________________  _____________________________________________________________________  _____________________________________________________________________    PCP _____________________________ Phone_________________     H&P__________________Bringing      Chart            Epic   DOS      Called________  EKG__________________Bringing      Chart            Epic   DOS      Called________  LAB__________________ Bringing      Chart            Epic   DOS      Called________  Cardiac Clearance_______Bringing      Chart            Epic      DOS      Called________    Cardiologist________________________ Phone___________________________    ? [employer] concerns / Waiver on Chart            PAT Communications________________  ? Pre-op Instructions Given [place]          _________________________________  ? Directions to Surgery Center                          _________________________________  ? Transportation Home_______________      __________________________________  ?  Crutches/Walker__________________        __________________________________    ________Pre-op Orders   _______Transcribed    _______Wt.  ________Pharmacy          _______SCD  ______VTE     ______TED Bonnie Skeeters  _______  Surgery Consent    _______  Anesthesia Consent         COVID DATE______________LOCATION________________ RESULT__________

## 2021-10-25 ENCOUNTER — HOSPITAL ENCOUNTER (OUTPATIENT)
Age: 51
Discharge: HOME | End: 2021-10-25
Payer: COMMERCIAL

## 2021-10-25 PROCEDURE — U0003 INFECTIOUS AGENT DETECTION BY NUCLEIC ACID (DNA OR RNA); SEVERE ACUTE RESPIRATORY SYNDROME CORONAVIRUS 2 (SARS-COV-2) (CORONAVIRUS DISEASE [COVID-19]), AMPLIFIED PROBE TECHNIQUE, MAKING USE OF HIGH THROUGHPUT TECHNOLOGIES AS DESCRIBED BY CMS-2020-01-R: HCPCS

## 2021-10-25 PROCEDURE — U0005 INFEC AGEN DETEC AMPLI PROBE: HCPCS

## 2021-10-25 RX ORDER — CHOLECALCIFEROL (VITAMIN D3) 125 MCG
CAPSULE ORAL
COMMUNITY

## 2021-10-25 RX ORDER — AMOXICILLIN 500 MG
CAPSULE ORAL
COMMUNITY

## 2021-10-26 LAB — SARS-COV-2, PCR: DETECTED

## 2021-11-11 NOTE — PROGRESS NOTES
Ct Shape    Age 54 y.o.    female    1970    MRN [id number]    11/19/2021  Arrival Time_____________  OR Time____________75 Min     Procedure(s):  VIDEO ARTHROSCOPY LEFT KNEE, PARTIAL MEDIAL/LATERAL MENISCECTOMY, CHONDROPLASTY, FAT PAD EXCISION                      General    Surgeon(s):  Kimberly Elizondo, MD       Phone [phone] ([place])     Initals  Date  [address]  Cell         Work  _____________________________________________________________________  _____________________________________________________________________  _____________________________________________________________________  _____________________________________________________________________  _____________________________________________________________________    PCP _____________________________ Phone_________________     H&P__________________Bringing      Chart            Epic   DOS      Called________  EKG__________________Bringing      Chart            Epic   DOS      Called________  LAB__________________ Bringing      Chart            Epic   DOS      Called________  Cardiac Clearance_______Bringing      Chart            Epic      DOS      Called________    Cardiologist________________________ Phone___________________________    ? [employer] concerns / Waiver on Chart            PAT Communications________________  ? Pre-op Instructions Given [place]          _________________________________  ? Directions to Surgery Center                          _________________________________  ? Transportation Home_______________      __________________________________  ?  Crutches/Walker__________________        __________________________________    ________Pre-op Orders   _______Transcribed    _______Wt.  ________Pharmacy          _______SCD  ______VTE     ______TED [facility]  _______  Surgery Consent    _______  Anesthesia Consent         COVID DATE______________LOCATION________________ RESULT__________

## 2021-11-15 ENCOUNTER — HOSPITAL ENCOUNTER (OUTPATIENT)
Age: 51
Discharge: HOME | End: 2021-11-15
Payer: COMMERCIAL

## 2021-11-15 LAB — SARS-COV-2: NOT DETECTED

## 2021-11-15 PROCEDURE — U0003 INFECTIOUS AGENT DETECTION BY NUCLEIC ACID (DNA OR RNA); SEVERE ACUTE RESPIRATORY SYNDROME CORONAVIRUS 2 (SARS-COV-2) (CORONAVIRUS DISEASE [COVID-19]), AMPLIFIED PROBE TECHNIQUE, MAKING USE OF HIGH THROUGHPUT TECHNOLOGIES AS DESCRIBED BY CMS-2020-01-R: HCPCS

## 2021-11-15 PROCEDURE — U0005 INFEC AGEN DETEC AMPLI PROBE: HCPCS

## 2021-11-15 NOTE — PROGRESS NOTES
Preoperative Screening for Elective Surgery/Invasive Procedures While COVID-19 present in the community     Have you had any of the following symptoms? No  o Fever, chills  o Cough  o Shortness of breath  o Muscle aches/pain  o Diarrhea  o Abdominal pain, nausea, vomiting  o Loss or decrease in taste and / or smell   Risk of Exposure  o Have you recently been hospitalized for COVID-19 or flu-like illness, if so when? No  o Recently diagnosed with COVID-19, if so when? 10/25/21  o Recently tested for COVID-19, if so when? 10/25/21  o Have you been in close contact with a person or family member who currently has or recently had [phone]? If yes, when and in what context? No  o Do you live with anybody who in the last 14 days has had fever, chills, shortness of breath, muscle aches, flu-like illness? No  o Do you have any close contacts or family members who are currently in the hospital for COVID-19 or flu-like illness? No  If yes, assess recent close contact with this person. Indicate if the patient has a positive screen by answering yes to one or more of the above questions. Patients who test positive or screen positive prior to surgery or on the day of surgery should be evaluated in conjunction with the surgeon/proceduralist/anesthesiologist to determine the urgency of the procedure.

## 2021-11-15 NOTE — PROGRESS NOTES
Obstructive Sleep Apnea (RADHA) Screening     Patient:  Rock Lanodn    YOB: 1970      Medical Record #:  [id number]                     Date:  11/15/2021     1. Are you a loud and/or regular snorer? []  Yes       [x] No    2. Have you been observed to gasp or stop breathing during sleep? []  Yes       [x] No    3. Do you feel tired or groggy upon awakening or do you awaken with a headache?           []  Yes       [] No    4. Are you often tired or fatigued during the wake time hours? []  Yes       [] No    5. Do you fall asleep sitting, reading, watching TV or driving? []  Yes       [] No    6. Do you often have problems with memory or concentration? []  Yes       [] No    **If patient's score is ? 3 they are considered high risk for RADHA. An Anesthesia provider will evaluate the patient and develop a plan of care the day of surgery. Note:  If the patient's BMI is more than 35 kg m¯² , has neck circumference > 40 cm, and/or high blood pressure the risk is greater (© American Sleep Apnea Association, 2006).

## 2021-11-19 ENCOUNTER — ANESTHESIA EVENT (OUTPATIENT)
Dept: OPERATING ROOM | Age: 51
End: 2021-11-19
Payer: COMMERCIAL

## 2021-11-19 ENCOUNTER — HOSPITAL ENCOUNTER (OUTPATIENT)
Age: 51
Setting detail: OUTPATIENT SURGERY
Discharge: HOME | End: 2021-11-19
Attending: ORTHOPAEDIC SURGERY | Admitting: ORTHOPAEDIC SURGERY
Payer: COMMERCIAL

## 2021-11-19 ENCOUNTER — ANESTHESIA (OUTPATIENT)
Dept: OPERATING ROOM | Age: 51
End: 2021-11-19
Payer: COMMERCIAL

## 2021-11-19 VITALS
RESPIRATION RATE: 5 BRPM | OXYGEN SATURATION: 100 % | SYSTOLIC BLOOD PRESSURE: 93 MMHG | DIASTOLIC BLOOD PRESSURE: 49 MMHG

## 2021-11-19 VITALS
TEMPERATURE: 98.6 F | HEART RATE: 67 BPM | SYSTOLIC BLOOD PRESSURE: 124 MMHG | DIASTOLIC BLOOD PRESSURE: 76 MMHG | OXYGEN SATURATION: 100 % | WEIGHT: 170 LBS | RESPIRATION RATE: 16 BRPM | BODY MASS INDEX: 27.32 KG/M2 | HEIGHT: 66 IN

## 2021-11-19 DIAGNOSIS — S83.207D ACUTE MENISCAL TEAR OF LEFT KNEE, SUBSEQUENT ENCOUNTER: Primary | ICD-10-CM

## 2021-11-19 PROCEDURE — 6360000002 HC RX W HCPCS: Performed by: NURSE ANESTHETIST, CERTIFIED REGISTERED

## 2021-11-19 PROCEDURE — 2500000003 HC RX 250 WO HCPCS: Performed by: ORTHOPAEDIC SURGERY

## 2021-11-19 PROCEDURE — 2709999900 HC NON-CHARGEABLE SUPPLY: Performed by: ORTHOPAEDIC SURGERY

## 2021-11-19 PROCEDURE — 2580000003 HC RX 258: Performed by: ORTHOPAEDIC SURGERY

## 2021-11-19 PROCEDURE — 3600000004 HC SURGERY LEVEL 4 BASE: Performed by: ORTHOPAEDIC SURGERY

## 2021-11-19 PROCEDURE — 3700000001 HC ADD 15 MINUTES (ANESTHESIA): Performed by: ORTHOPAEDIC SURGERY

## 2021-11-19 PROCEDURE — 3600000014 HC SURGERY LEVEL 4 ADDTL 15MIN: Performed by: ORTHOPAEDIC SURGERY

## 2021-11-19 PROCEDURE — 7100000010 HC PHASE II RECOVERY - FIRST 15 MIN: Performed by: ORTHOPAEDIC SURGERY

## 2021-11-19 PROCEDURE — 3700000000 HC ANESTHESIA ATTENDED CARE: Performed by: ORTHOPAEDIC SURGERY

## 2021-11-19 PROCEDURE — 7100000001 HC PACU RECOVERY - ADDTL 15 MIN: Performed by: ORTHOPAEDIC SURGERY

## 2021-11-19 PROCEDURE — 7100000000 HC PACU RECOVERY - FIRST 15 MIN: Performed by: ORTHOPAEDIC SURGERY

## 2021-11-19 PROCEDURE — 2500000003 HC RX 250 WO HCPCS: Performed by: NURSE ANESTHETIST, CERTIFIED REGISTERED

## 2021-11-19 PROCEDURE — 6360000002 HC RX W HCPCS: Performed by: ORTHOPAEDIC SURGERY

## 2021-11-19 PROCEDURE — 7100000011 HC PHASE II RECOVERY - ADDTL 15 MIN: Performed by: ORTHOPAEDIC SURGERY

## 2021-11-19 PROCEDURE — 2580000003 HC RX 258: Performed by: ANESTHESIOLOGY

## 2021-11-19 PROCEDURE — 2720000010 HC SURG SUPPLY STERILE: Performed by: ORTHOPAEDIC SURGERY

## 2021-11-19 RX ORDER — ONDANSETRON 2 MG/ML
4 INJECTION INTRAMUSCULAR; INTRAVENOUS EVERY 10 MIN PRN
Status: DISCONTINUED | OUTPATIENT
Start: 2021-11-19 | End: 2021-11-19 | Stop reason: HOSPADM

## 2021-11-19 RX ORDER — SODIUM CHLORIDE, SODIUM LACTATE, POTASSIUM CHLORIDE, CALCIUM CHLORIDE 600; 310; 30; 20 MG/100ML; MG/100ML; MG/100ML; MG/100ML
INJECTION, SOLUTION INTRAVENOUS CONTINUOUS
Status: DISCONTINUED | OUTPATIENT
Start: 2021-11-19 | End: 2021-11-19 | Stop reason: HOSPADM

## 2021-11-19 RX ORDER — MIDAZOLAM HYDROCHLORIDE 1 MG/ML
INJECTION INTRAMUSCULAR; INTRAVENOUS PRN
Status: DISCONTINUED | OUTPATIENT
Start: 2021-11-19 | End: 2021-11-19 | Stop reason: SDUPTHER

## 2021-11-19 RX ORDER — FENTANYL CITRATE 50 UG/ML
INJECTION, SOLUTION INTRAMUSCULAR; INTRAVENOUS PRN
Status: DISCONTINUED | OUTPATIENT
Start: 2021-11-19 | End: 2021-11-19 | Stop reason: SDUPTHER

## 2021-11-19 RX ORDER — OXYCODONE HYDROCHLORIDE AND ACETAMINOPHEN 5; 325 MG/1; MG/1
2 TABLET ORAL PRN
Status: DISCONTINUED | OUTPATIENT
Start: 2021-11-19 | End: 2021-11-19 | Stop reason: HOSPADM

## 2021-11-19 RX ORDER — OXYCODONE HYDROCHLORIDE AND ACETAMINOPHEN 5; 325 MG/1; MG/1
1 TABLET ORAL PRN
Status: DISCONTINUED | OUTPATIENT
Start: 2021-11-19 | End: 2021-11-19 | Stop reason: HOSPADM

## 2021-11-19 RX ORDER — LIDOCAINE HYDROCHLORIDE 20 MG/ML
INJECTION, SOLUTION INFILTRATION; PERINEURAL PRN
Status: DISCONTINUED | OUTPATIENT
Start: 2021-11-19 | End: 2021-11-19 | Stop reason: SDUPTHER

## 2021-11-19 RX ORDER — PROPOFOL 10 MG/ML
INJECTION, EMULSION INTRAVENOUS PRN
Status: DISCONTINUED | OUTPATIENT
Start: 2021-11-19 | End: 2021-11-19 | Stop reason: SDUPTHER

## 2021-11-19 RX ORDER — HYDRALAZINE HYDROCHLORIDE 20 MG/ML
5 INJECTION INTRAMUSCULAR; INTRAVENOUS EVERY 10 MIN PRN
Status: DISCONTINUED | OUTPATIENT
Start: 2021-11-19 | End: 2021-11-19 | Stop reason: HOSPADM

## 2021-11-19 RX ORDER — HYDROCODONE BITARTRATE AND ACETAMINOPHEN 5; 325 MG/1; MG/1
1 TABLET ORAL EVERY 4 HOURS PRN
Qty: 18 TABLET | Refills: 0 | Status: SHIPPED | OUTPATIENT
Start: 2021-11-19 | End: 2021-11-22

## 2021-11-19 RX ORDER — DEXAMETHASONE SODIUM PHOSPHATE 4 MG/ML
INJECTION, SOLUTION INTRA-ARTICULAR; INTRALESIONAL; INTRAMUSCULAR; INTRAVENOUS; SOFT TISSUE PRN
Status: DISCONTINUED | OUTPATIENT
Start: 2021-11-19 | End: 2021-11-19 | Stop reason: SDUPTHER

## 2021-11-19 RX ORDER — ONDANSETRON 2 MG/ML
INJECTION INTRAMUSCULAR; INTRAVENOUS PRN
Status: DISCONTINUED | OUTPATIENT
Start: 2021-11-19 | End: 2021-11-19 | Stop reason: SDUPTHER

## 2021-11-19 RX ORDER — BUPIVACAINE HYDROCHLORIDE 2.5 MG/ML
INJECTION, SOLUTION INFILTRATION; PERINEURAL PRN
Status: DISCONTINUED | OUTPATIENT
Start: 2021-11-19 | End: 2021-11-19 | Stop reason: ALTCHOICE

## 2021-11-19 RX ORDER — MEPERIDINE HYDROCHLORIDE 50 MG/ML
12.5 INJECTION INTRAMUSCULAR; INTRAVENOUS; SUBCUTANEOUS EVERY 5 MIN PRN
Status: DISCONTINUED | OUTPATIENT
Start: 2021-11-19 | End: 2021-11-19 | Stop reason: HOSPADM

## 2021-11-19 RX ORDER — KETOROLAC TROMETHAMINE 30 MG/ML
INJECTION, SOLUTION INTRAMUSCULAR; INTRAVENOUS PRN
Status: DISCONTINUED | OUTPATIENT
Start: 2021-11-19 | End: 2021-11-19 | Stop reason: SDUPTHER

## 2021-11-19 RX ORDER — SODIUM CHLORIDE, SODIUM LACTATE, POTASSIUM CHLORIDE, AND CALCIUM CHLORIDE .6; .31; .03; .02 G/100ML; G/100ML; G/100ML; G/100ML
IRRIGANT IRRIGATION PRN
Status: DISCONTINUED | OUTPATIENT
Start: 2021-11-19 | End: 2021-11-19 | Stop reason: ALTCHOICE

## 2021-11-19 RX ORDER — LABETALOL HYDROCHLORIDE 5 MG/ML
5 INJECTION, SOLUTION INTRAVENOUS EVERY 10 MIN PRN
Status: DISCONTINUED | OUTPATIENT
Start: 2021-11-19 | End: 2021-11-19 | Stop reason: HOSPADM

## 2021-11-19 RX ADMIN — CEFAZOLIN 2000 MG: 10 INJECTION, POWDER, FOR SOLUTION INTRAVENOUS at 11:09

## 2021-11-19 RX ADMIN — FENTANYL CITRATE 25 MCG: 50 INJECTION INTRAMUSCULAR; INTRAVENOUS at 11:31

## 2021-11-19 RX ADMIN — SODIUM CHLORIDE, POTASSIUM CHLORIDE, SODIUM LACTATE AND CALCIUM CHLORIDE: 600; 310; 30; 20 INJECTION, SOLUTION INTRAVENOUS at 10:07

## 2021-11-19 RX ADMIN — PROPOFOL 200 MG: 10 INJECTION, EMULSION INTRAVENOUS at 11:16

## 2021-11-19 RX ADMIN — FENTANYL CITRATE 50 MCG: 50 INJECTION INTRAMUSCULAR; INTRAVENOUS at 11:16

## 2021-11-19 RX ADMIN — MIDAZOLAM HYDROCHLORIDE 2 MG: 2 INJECTION, SOLUTION INTRAMUSCULAR; INTRAVENOUS at 11:11

## 2021-11-19 RX ADMIN — KETOROLAC TROMETHAMINE 30 MG: 30 INJECTION, SOLUTION INTRAMUSCULAR; INTRAVENOUS at 11:33

## 2021-11-19 RX ADMIN — ONDANSETRON 4 MG: 2 INJECTION INTRAMUSCULAR; INTRAVENOUS at 11:16

## 2021-11-19 RX ADMIN — FENTANYL CITRATE 25 MCG: 50 INJECTION INTRAMUSCULAR; INTRAVENOUS at 11:30

## 2021-11-19 RX ADMIN — LIDOCAINE HYDROCHLORIDE 60 MG: 20 INJECTION, SOLUTION INFILTRATION; PERINEURAL at 11:16

## 2021-11-19 RX ADMIN — DEXAMETHASONE SODIUM PHOSPHATE 10 MG: 4 INJECTION, SOLUTION INTRAMUSCULAR; INTRAVENOUS at 11:16

## 2021-11-19 ASSESSMENT — PULMONARY FUNCTION TESTS
PIF_VALUE: 7
PIF_VALUE: 7
PIF_VALUE: 0
PIF_VALUE: 1
PIF_VALUE: 7
PIF_VALUE: 1
PIF_VALUE: 2
PIF_VALUE: 16
PIF_VALUE: 7
PIF_VALUE: 7
PIF_VALUE: 6
PIF_VALUE: 7
PIF_VALUE: 1
PIF_VALUE: 7
PIF_VALUE: 1
PIF_VALUE: 7
PIF_VALUE: 10
PIF_VALUE: 2
PIF_VALUE: 0
PIF_VALUE: 7
PIF_VALUE: 7
PIF_VALUE: 13
PIF_VALUE: 7
PIF_VALUE: 8
PIF_VALUE: 26
PIF_VALUE: 8
PIF_VALUE: 7
PIF_VALUE: 15
PIF_VALUE: 2
PIF_VALUE: 7
PIF_VALUE: 20
PIF_VALUE: 7
PIF_VALUE: 0
PIF_VALUE: 7
PIF_VALUE: 1
PIF_VALUE: 7
PIF_VALUE: 7
PIF_VALUE: 0
PIF_VALUE: 7

## 2021-11-19 ASSESSMENT — PAIN - FUNCTIONAL ASSESSMENT: PAIN_FUNCTIONAL_ASSESSMENT: 0-10

## 2021-11-19 ASSESSMENT — PAIN SCALES - GENERAL
PAINLEVEL_OUTOF10: 0
PAINLEVEL_OUTOF10: 0

## 2021-11-19 NOTE — BRIEF OP NOTE
Brief Postoperative Note      Patient: Cinthya Islas  YOB: 1970  MRN: [id number]    Date of Procedure: 11/19/2021    Pre-Op Diagnosis: LEFT KNEE MEDIAL AND LATERAL MENISCUS TEARS, HOFFA'S FAT PAD HYPERTROPHY    Post-Op Diagnosis: Same       Procedure(s):  VIDEO ARTHROSCOPY LEFT KNEE, PARTIAL MEDIAL/LATERAL MENISCECTOMY, CHONDROPLASTY, FAT PAD EXCISION    Surgeon(s):  Wilmer Mendes MD    Assistant:  Surgical Assistant: Sharyn Agrawal    Anesthesia: General    Estimated Blood Loss (mL): Minimal    Complications: None    Specimens:   * No specimens in log *    Implants:  * No implants in log *      Drains: * No LDAs found *    Findings: AS ABOVE    Electronically signed by Wilmer Mendes MD on 11/19/2021 at 12:08 PM

## 2021-11-19 NOTE — ANESTHESIA PRE PROCEDURE
Department of Anesthesiology  Preprocedure Note       Name:  Charley Pruett   Age:  54 y.o.  :  1970                                          MRN:  [id number]         Date:  2021      Surgeon: Ingrid Huerta):  Luis Wade MD    Procedure: Procedure(s):  VIDEO ARTHROSCOPY LEFT KNEE, PARTIAL MEDIAL/LATERAL MENISCECTOMY, CHONDROPLASTY, FAT PAD EXCISION    Medications prior to admission:   Prior to Admission medications    Medication Sig Start Date End Date Taking?  Authorizing Provider   Multiple Vitamin (MULTI-VITAMIN DAILY PO) Take by mouth daily   Yes Historical Provider, MD   zinc 50 MG CAPS Take by mouth   Yes Historical Provider, MD   Omega-3 Fatty Acids (FISH OIL) 1200 MG CAPS Take by mouth   Yes Historical Provider, MD   Cholecalciferol (D3 HIGH POTENCY) 50 MCG (2000 UT) CAPS Take by mouth   Yes Historical Provider, MD   Probiotic Product (PROBIOTIC ADVANCED PO) Take by mouth    Historical Provider, MD   ibuprofen (ADVIL;MOTRIN) 400 MG tablet Take 400 mg by mouth every 6 hours as needed  17   Historical Provider, MD       Current medications:    Current Facility-Administered Medications   Medication Dose Route Frequency Provider Last Rate Last Admin    ceFAZolin (ANCEF) 2000 mg in dextrose 5 % 100 mL IVPB  2,000 mg IntraVENous On Call to [address], MD           Allergies:  No Known Allergies    Problem List:    Patient Active Problem List   Diagnosis Code    Acquired breast deformity N64.89    Allergic rhinitis J30.9    HPV test positive [id number]    Micromastia N64.82    Plantar fascia rupture S93.699A    Plantar fascial fibromatosis M72.2    S/P LEEP Z98.890    Acute meniscal tear of left knee S83.207A       Past Medical History:        Diagnosis Date    Arthritis     COVID-19 10/25/2021    PONV (postoperative nausea and vomiting)        Past Surgical History:        Procedure Laterality Date    BREAST ENHANCEMENT SURGERY      BREAST REDUCTION SURGERY       SECTION  KNEE ARTHROSCOPY Bilateral     KNEE ARTHROSCOPY Left 1/19/2021    LEFT KNEE ARTHROSCOPIC LATERAL MENISCECTOMY PARTIAL MEDIAL, PARTIAL LATERAL, CHORDROPLASTY MEDIAL LATERAL PATELLOFEMORAL CHORDROPLASTY performed by Fadia Purvis MD at [address] [place] [address]      OTHER SURGICAL HISTORY      BREAST IMPLANT REMOVAL       Social History:    Social History     Tobacco Use    Smoking status: Never Smoker    Smokeless tobacco: Never Used   Substance Use Topics    Alcohol use: Yes     Comment: 0-2 DRINKS PER WEEK                                Counseling given: Not Answered      Vital Signs (Current):   Vitals:    10/25/21 1636 11/15/21 1429   Weight: 170 lb (77.1 kg) 170 lb (77.1 kg)   Height: 5' 6\" (1.676 m) 5' 6\" (1.676 m)                                              BP Readings from Last 3 Encounters:   01/19/21 (!) 140/84   01/19/21 (!) 93/54       NPO Status:                                                                                 BMI:   Wt Readings from Last 3 Encounters:   11/15/21 170 lb (77.1 kg)   01/19/21 168 lb 12.8 oz (76.6 kg)   12/18/20 160 lb (72.6 kg)     Body mass index is 27.44 kg/m². CBC: No results found for: WBC, RBC, HGB, HCT, MCV, RDW, PLT    CMP: No results found for: NA, K, CL, CO2, BUN, CREATININE, GFRAA, AGRATIO, LABGLOM, GLUCOSE, PROT, CALCIUM, BILITOT, ALKPHOS, AST, ALT    POC Tests: No results for input(s): POCGLU, POCNA, POCK, POCCL, POCBUN, POCHEMO, POCHCT in the last 72 hours.     Coags: No results found for: PROTIME, INR, APTT    HCG (If Applicable): No results found for: PREGTESTUR, PREGSERUM, HCG, HCGQUANT     ABGs: No results found for: PHART, PO2ART, EGP6SDW, AJV3KXN, BEART, O0CQFGKN     Type & Screen (If Applicable):  No results found for: LABABO, LABRH    Drug/Infectious Status (If Applicable):  No results found for: HIV, HEPCAB    COVID-19 Screening (If Applicable):   Lab Results   Component Value Date    COVID19 Not Detected 11/15/2021    COVID19 DETECTED 10/25/2021 Anesthesia Evaluation  Patient summary reviewed and Nursing notes reviewed   history of anesthetic complications: PONV. Airway: Mallampati: II  TM distance: >3 FB   Neck ROM: full  Mouth opening: > = 3 FB Dental: normal exam         Pulmonary:Negative Pulmonary ROS and normal exam                               Cardiovascular:Negative CV ROS                      Neuro/Psych:   Negative Neuro/Psych ROS              GI/Hepatic/Renal: Neg GI/Hepatic/Renal ROS       (-) GERD, liver disease and no renal disease       Endo/Other:    (+) : arthritis:., .    (-) diabetes mellitus               Abdominal:             Vascular: negative vascular ROS. Other Findings:           Anesthesia Plan      general     ASA 2     (I discussed with the patient the risks and benefits of PIV, general anesthesia, IV Narcotics, PACU. All questions were answered the patient agrees with the plan)  Induction: intravenous. MIPS: Prophylactic antiemetics administered. Anesthetic plan and risks discussed with patient. Plan discussed with CRNA.                   Celine Almazan MD   11/19/2021

## 2021-11-19 NOTE — ANESTHESIA POSTPROCEDURE EVALUATION
Department of Anesthesiology  Postprocedure Note    Patient: Charley Pruett  MRN: [id number]  YOB: 1970  Date of evaluation: 11/19/2021  Time:  1:00 PM     Procedure Summary     Date: 11/19/21 Room / Location: [address] [place] [address]    Anesthesia Start: 1111 Anesthesia Stop: 1215    Procedure: VIDEO ARTHROSCOPY LEFT KNEE, PARTIAL MEDIAL/LATERAL MENISCECTOMY, CHONDROPLASTY, FAT PAD EXCISION (Left Knee) Diagnosis:       Complex tear of medial meniscus of left knee as current injury, initial encounter      Complex tear of lateral meniscus of left knee as current injury, initial encounter      Hoffa's fat pad disease ([facility] [address]      (LEFT KNEE MEDIAL AND LATERAL MENISCUS TEARS, HOFFA'S FAT PAD)    Surgeons: Luis Wade MD Responsible Provider: Ethel Ivy MD    Anesthesia Type: general ASA Status: 2          Anesthesia Type: general    Kelli Phase I: Kelli Score: 10    Kelli Phase II: Kelli Score: 10    Last vitals: Reviewed and per EMR flowsheets.        Anesthesia Post Evaluation    Patient location during evaluation: PACU  Level of consciousness: awake  Airway patency: patent  Nausea & Vomiting: no nausea  Complications: no  Cardiovascular status: blood pressure returned to baseline  Respiratory status: acceptable  Hydration status: euvolemic

## 2021-11-20 ENCOUNTER — HOSPITAL ENCOUNTER (EMERGENCY)
Age: 51
Discharge: HOME | End: 2021-11-20
Attending: EMERGENCY MEDICINE
Payer: COMMERCIAL

## 2021-11-20 ENCOUNTER — ANESTHESIA EVENT (OUTPATIENT)
Dept: OPERATING ROOM | Age: 51
End: 2021-11-20
Payer: COMMERCIAL

## 2021-11-20 ENCOUNTER — ANESTHESIA (OUTPATIENT)
Dept: OPERATING ROOM | Age: 51
End: 2021-11-20
Payer: COMMERCIAL

## 2021-11-20 VITALS
HEART RATE: 74 BPM | OXYGEN SATURATION: 99 % | RESPIRATION RATE: 15 BRPM | TEMPERATURE: 96.7 F | DIASTOLIC BLOOD PRESSURE: 92 MMHG | SYSTOLIC BLOOD PRESSURE: 135 MMHG

## 2021-11-20 VITALS
DIASTOLIC BLOOD PRESSURE: 58 MMHG | SYSTOLIC BLOOD PRESSURE: 109 MMHG | RESPIRATION RATE: 21 BRPM | TEMPERATURE: 98.2 F | OXYGEN SATURATION: 100 %

## 2021-11-20 DIAGNOSIS — M96.830 POSTOPERATIVE HEMORRHAGE OF MUSCULOSKELETAL STRUCTURE FOLLOWING MUSCULOSKELETAL PROCEDURE: Primary | ICD-10-CM

## 2021-11-20 LAB
ANION GAP SERPL CALCULATED.3IONS-SCNC: 11 MMOL/L (ref 3–16)
BASOPHILS ABSOLUTE: 0 K/UL (ref 0–0.2)
BASOPHILS RELATIVE PERCENT: 0.6 %
BUN BLDV-MCNC: 24 MG/DL (ref 7–20)
CALCIUM SERPL-MCNC: 9.1 MG/DL (ref 8.3–10.6)
CHLORIDE BLD-SCNC: 103 MMOL/L (ref 99–110)
CO2: 26 MMOL/L (ref 21–32)
CREAT SERPL-MCNC: 0.8 MG/DL (ref 0.6–1.1)
EOSINOPHILS ABSOLUTE: 0.1 K/UL (ref 0–0.6)
EOSINOPHILS RELATIVE PERCENT: 0.9 %
GFR AFRICAN AMERICAN: >60
GFR NON-AFRICAN AMERICAN: >60
GLUCOSE BLD-MCNC: 125 MG/DL (ref 70–99)
HCT VFR BLD CALC: 41 % (ref 36–48)
HEMOGLOBIN: 14 G/DL (ref 12–16)
LYMPHOCYTES ABSOLUTE: 2.9 K/UL (ref 1–5.1)
LYMPHOCYTES RELATIVE PERCENT: 35 %
MCH RBC QN AUTO: 32.1 PG (ref 26–34)
MCHC RBC AUTO-ENTMCNC: 34.1 G/DL (ref 31–36)
MCV RBC AUTO: 94.2 FL (ref 80–100)
MONOCYTES ABSOLUTE: 0.6 K/UL (ref 0–1.3)
MONOCYTES RELATIVE PERCENT: 6.9 %
NEUTROPHILS ABSOLUTE: 4.7 K/UL (ref 1.7–7.7)
NEUTROPHILS RELATIVE PERCENT: 56.6 %
PDW BLD-RTO: 14 % (ref 12.4–15.4)
PLATELET # BLD: 170 K/UL (ref 135–450)
PMV BLD AUTO: 10 FL (ref 5–10.5)
POTASSIUM REFLEX MAGNESIUM: 3.9 MMOL/L (ref 3.5–5.1)
RBC # BLD: 4.35 M/UL (ref 4–5.2)
SODIUM BLD-SCNC: 140 MMOL/L (ref 136–145)
SPECIMEN STATUS: NORMAL
WBC # BLD: 8.4 K/UL (ref 4–11)

## 2021-11-20 PROCEDURE — 99284 EMERGENCY DEPT VISIT MOD MDM: CPT

## 2021-11-20 PROCEDURE — 80048 BASIC METABOLIC PNL TOTAL CA: CPT

## 2021-11-20 PROCEDURE — 2500000003 HC RX 250 WO HCPCS: Performed by: EMERGENCY MEDICINE

## 2021-11-20 PROCEDURE — 2580000003 HC RX 258: Performed by: NURSE ANESTHETIST, CERTIFIED REGISTERED

## 2021-11-20 PROCEDURE — 6360000002 HC RX W HCPCS: Performed by: NURSE ANESTHETIST, CERTIFIED REGISTERED

## 2021-11-20 PROCEDURE — 6360000002 HC RX W HCPCS: Performed by: ORTHOPAEDIC SURGERY

## 2021-11-20 PROCEDURE — 96375 TX/PRO/DX INJ NEW DRUG ADDON: CPT

## 2021-11-20 PROCEDURE — 85025 COMPLETE CBC W/AUTO DIFF WBC: CPT

## 2021-11-20 PROCEDURE — 2709999900 HC NON-CHARGEABLE SUPPLY: Performed by: ORTHOPAEDIC SURGERY

## 2021-11-20 PROCEDURE — 3600000004 HC SURGERY LEVEL 4 BASE: Performed by: ORTHOPAEDIC SURGERY

## 2021-11-20 PROCEDURE — 7100000001 HC PACU RECOVERY - ADDTL 15 MIN: Performed by: ORTHOPAEDIC SURGERY

## 2021-11-20 PROCEDURE — 3700000000 HC ANESTHESIA ATTENDED CARE: Performed by: ORTHOPAEDIC SURGERY

## 2021-11-20 PROCEDURE — 2500000003 HC RX 250 WO HCPCS: Performed by: NURSE ANESTHETIST, CERTIFIED REGISTERED

## 2021-11-20 PROCEDURE — 2580000003 HC RX 258: Performed by: ORTHOPAEDIC SURGERY

## 2021-11-20 PROCEDURE — 7100000010 HC PHASE II RECOVERY - FIRST 15 MIN: Performed by: ORTHOPAEDIC SURGERY

## 2021-11-20 PROCEDURE — 2500000003 HC RX 250 WO HCPCS: Performed by: ORTHOPAEDIC SURGERY

## 2021-11-20 PROCEDURE — 7100000000 HC PACU RECOVERY - FIRST 15 MIN: Performed by: ORTHOPAEDIC SURGERY

## 2021-11-20 PROCEDURE — 2580000003 HC RX 258: Performed by: EMERGENCY MEDICINE

## 2021-11-20 PROCEDURE — 6360000002 HC RX W HCPCS: Performed by: EMERGENCY MEDICINE

## 2021-11-20 PROCEDURE — 2720000010 HC SURG SUPPLY STERILE: Performed by: ORTHOPAEDIC SURGERY

## 2021-11-20 PROCEDURE — 3700000001 HC ADD 15 MINUTES (ANESTHESIA): Performed by: ORTHOPAEDIC SURGERY

## 2021-11-20 PROCEDURE — 96374 THER/PROPH/DIAG INJ IV PUSH: CPT

## 2021-11-20 PROCEDURE — 3600000014 HC SURGERY LEVEL 4 ADDTL 15MIN: Performed by: ORTHOPAEDIC SURGERY

## 2021-11-20 RX ORDER — CEPHALEXIN 500 MG/1
500 CAPSULE ORAL 3 TIMES DAILY
Qty: 30 CAPSULE | Refills: 0 | Status: SHIPPED | OUTPATIENT
Start: 2021-11-20

## 2021-11-20 RX ORDER — DIPHENHYDRAMINE HYDROCHLORIDE 50 MG/ML
6.25 INJECTION INTRAMUSCULAR; INTRAVENOUS
Status: DISCONTINUED | OUTPATIENT
Start: 2021-11-20 | End: 2021-11-20 | Stop reason: HOSPADM

## 2021-11-20 RX ORDER — LABETALOL HYDROCHLORIDE 5 MG/ML
5 INJECTION, SOLUTION INTRAVENOUS
Status: DISCONTINUED | OUTPATIENT
Start: 2021-11-20 | End: 2021-11-20 | Stop reason: HOSPADM

## 2021-11-20 RX ORDER — SODIUM CHLORIDE, SODIUM LACTATE, POTASSIUM CHLORIDE, CALCIUM CHLORIDE 600; 310; 30; 20 MG/100ML; MG/100ML; MG/100ML; MG/100ML
INJECTION, SOLUTION INTRAVENOUS CONTINUOUS PRN
Status: DISCONTINUED | OUTPATIENT
Start: 2021-11-20 | End: 2021-11-20 | Stop reason: SDUPTHER

## 2021-11-20 RX ORDER — SUCCINYLCHOLINE CHLORIDE 20 MG/ML
INJECTION INTRAMUSCULAR; INTRAVENOUS PRN
Status: DISCONTINUED | OUTPATIENT
Start: 2021-11-20 | End: 2021-11-20 | Stop reason: SDUPTHER

## 2021-11-20 RX ORDER — LIDOCAINE HYDROCHLORIDE AND EPINEPHRINE 10; 10 MG/ML; UG/ML
INJECTION, SOLUTION INFILTRATION; PERINEURAL PRN
Status: DISCONTINUED | OUTPATIENT
Start: 2021-11-20 | End: 2021-11-20 | Stop reason: ALTCHOICE

## 2021-11-20 RX ORDER — PROPOFOL 10 MG/ML
INJECTION, EMULSION INTRAVENOUS PRN
Status: DISCONTINUED | OUTPATIENT
Start: 2021-11-20 | End: 2021-11-20 | Stop reason: SDUPTHER

## 2021-11-20 RX ORDER — ROCURONIUM BROMIDE 10 MG/ML
INJECTION, SOLUTION INTRAVENOUS PRN
Status: DISCONTINUED | OUTPATIENT
Start: 2021-11-20 | End: 2021-11-20 | Stop reason: SDUPTHER

## 2021-11-20 RX ORDER — OXYCODONE HYDROCHLORIDE AND ACETAMINOPHEN 5; 325 MG/1; MG/1
2 TABLET ORAL PRN
Status: DISCONTINUED | OUTPATIENT
Start: 2021-11-20 | End: 2021-11-20 | Stop reason: HOSPADM

## 2021-11-20 RX ORDER — BUPIVACAINE HYDROCHLORIDE 5 MG/ML
INJECTION, SOLUTION EPIDURAL; INTRACAUDAL PRN
Status: DISCONTINUED | OUTPATIENT
Start: 2021-11-20 | End: 2021-11-20 | Stop reason: ALTCHOICE

## 2021-11-20 RX ORDER — DEXAMETHASONE SODIUM PHOSPHATE 4 MG/ML
INJECTION, SOLUTION INTRA-ARTICULAR; INTRALESIONAL; INTRAMUSCULAR; INTRAVENOUS; SOFT TISSUE PRN
Status: DISCONTINUED | OUTPATIENT
Start: 2021-11-20 | End: 2021-11-20 | Stop reason: SDUPTHER

## 2021-11-20 RX ORDER — ONDANSETRON 2 MG/ML
INJECTION INTRAMUSCULAR; INTRAVENOUS PRN
Status: DISCONTINUED | OUTPATIENT
Start: 2021-11-20 | End: 2021-11-20 | Stop reason: SDUPTHER

## 2021-11-20 RX ORDER — SODIUM CHLORIDE, SODIUM LACTATE, POTASSIUM CHLORIDE, AND CALCIUM CHLORIDE .6; .31; .03; .02 G/100ML; G/100ML; G/100ML; G/100ML
1000 INJECTION, SOLUTION INTRAVENOUS ONCE
Status: COMPLETED | OUTPATIENT
Start: 2021-11-20 | End: 2021-11-20

## 2021-11-20 RX ORDER — OXYCODONE HYDROCHLORIDE AND ACETAMINOPHEN 5; 325 MG/1; MG/1
1 TABLET ORAL PRN
Status: DISCONTINUED | OUTPATIENT
Start: 2021-11-20 | End: 2021-11-20 | Stop reason: HOSPADM

## 2021-11-20 RX ORDER — LIDOCAINE HYDROCHLORIDE 20 MG/ML
INJECTION, SOLUTION INFILTRATION; PERINEURAL PRN
Status: DISCONTINUED | OUTPATIENT
Start: 2021-11-20 | End: 2021-11-20 | Stop reason: SDUPTHER

## 2021-11-20 RX ORDER — ONDANSETRON 2 MG/ML
4 INJECTION INTRAMUSCULAR; INTRAVENOUS ONCE
Status: COMPLETED | OUTPATIENT
Start: 2021-11-20 | End: 2021-11-20

## 2021-11-20 RX ORDER — HYDRALAZINE HYDROCHLORIDE 20 MG/ML
5 INJECTION INTRAMUSCULAR; INTRAVENOUS EVERY 30 MIN PRN
Status: DISCONTINUED | OUTPATIENT
Start: 2021-11-20 | End: 2021-11-20 | Stop reason: HOSPADM

## 2021-11-20 RX ORDER — ONDANSETRON 2 MG/ML
4 INJECTION INTRAMUSCULAR; INTRAVENOUS EVERY 30 MIN PRN
Status: DISCONTINUED | OUTPATIENT
Start: 2021-11-20 | End: 2021-11-20 | Stop reason: HOSPADM

## 2021-11-20 RX ORDER — MEPERIDINE HYDROCHLORIDE 50 MG/ML
12.5 INJECTION INTRAMUSCULAR; INTRAVENOUS; SUBCUTANEOUS EVERY 5 MIN PRN
Status: DISCONTINUED | OUTPATIENT
Start: 2021-11-20 | End: 2021-11-20 | Stop reason: HOSPADM

## 2021-11-20 RX ADMIN — ONDANSETRON 4 MG: 2 INJECTION INTRAMUSCULAR; INTRAVENOUS at 16:43

## 2021-11-20 RX ADMIN — DEXAMETHASONE SODIUM PHOSPHATE 8 MG: 4 INJECTION, SOLUTION INTRAMUSCULAR; INTRAVENOUS at 17:30

## 2021-11-20 RX ADMIN — ONDANSETRON 4 MG: 2 INJECTION INTRAMUSCULAR; INTRAVENOUS at 17:30

## 2021-11-20 RX ADMIN — SODIUM CHLORIDE, POTASSIUM CHLORIDE, SODIUM LACTATE AND CALCIUM CHLORIDE 1000 ML: 600; 310; 30; 20 INJECTION, SOLUTION INTRAVENOUS at 16:43

## 2021-11-20 RX ADMIN — ROCURONIUM BROMIDE 10 MG: 10 SOLUTION INTRAVENOUS at 17:25

## 2021-11-20 RX ADMIN — SUCCINYLCHOLINE CHLORIDE 120 MG: 20 INJECTION, SOLUTION INTRAMUSCULAR; INTRAVENOUS at 17:25

## 2021-11-20 RX ADMIN — SUGAMMADEX 200 MG: 100 INJECTION, SOLUTION INTRAVENOUS at 18:03

## 2021-11-20 RX ADMIN — LIDOCAINE HYDROCHLORIDE 80 MG: 20 INJECTION, SOLUTION INFILTRATION; PERINEURAL at 17:25

## 2021-11-20 RX ADMIN — SODIUM CHLORIDE, SODIUM LACTATE, POTASSIUM CHLORIDE, AND CALCIUM CHLORIDE: .6; .31; .03; .02 INJECTION, SOLUTION INTRAVENOUS at 17:19

## 2021-11-20 RX ADMIN — ROCURONIUM BROMIDE 40 MG: 10 SOLUTION INTRAVENOUS at 17:31

## 2021-11-20 RX ADMIN — FAMOTIDINE 20 MG: 10 INJECTION, SOLUTION INTRAVENOUS at 16:43

## 2021-11-20 RX ADMIN — PROPOFOL 200 MG: 10 INJECTION, EMULSION INTRAVENOUS at 17:25

## 2021-11-20 ASSESSMENT — PULMONARY FUNCTION TESTS
PIF_VALUE: 19
PIF_VALUE: 1
PIF_VALUE: 19
PIF_VALUE: 18
PIF_VALUE: 19
PIF_VALUE: 18
PIF_VALUE: 19
PIF_VALUE: 18
PIF_VALUE: 19
PIF_VALUE: 1
PIF_VALUE: 19
PIF_VALUE: 26
PIF_VALUE: 18
PIF_VALUE: 19
PIF_VALUE: 2
PIF_VALUE: 16
PIF_VALUE: 18
PIF_VALUE: 16
PIF_VALUE: 18
PIF_VALUE: 19
PIF_VALUE: 18
PIF_VALUE: 0
PIF_VALUE: 0
PIF_VALUE: 18
PIF_VALUE: 15
PIF_VALUE: 18
PIF_VALUE: 15
PIF_VALUE: 23
PIF_VALUE: 16
PIF_VALUE: 19

## 2021-11-20 ASSESSMENT — PAIN SCALES - GENERAL: PAINLEVEL_OUTOF10: 0

## 2021-11-20 NOTE — ANESTHESIA PRE PROCEDURE
Department of Anesthesiology  Preprocedure Note       Name:  Nydia Stroud   Age:  54 y.o.  :  1970                                          MRN:  [id number]         Date:  2021      Surgeon: Sylvester Garcia):  Ailyn Mckeon MD    Procedure: Procedure(s):  LEFT KNEE ARTHROSCOPY INCISION AND DRAINAGE WITH CLOSURE    Medications prior to admission:   Prior to Admission medications    Medication Sig Start Date End Date Taking? Authorizing Provider   HYDROcodone-acetaminophen (NORCO) 5-325 MG per tablet Take 1 tablet by mouth every 4 hours as needed for Pain for up to 3 days. Intended supply: 3 days.  Take lowest dose possible to manage pain 21 Yes Ailyn Mckeon MD   Multiple Vitamin (MULTI-VITAMIN DAILY PO) Take by mouth daily   Yes Historical Provider, MD   zinc 50 MG CAPS Take by mouth   Yes Historical Provider, MD   Probiotic Product (PROBIOTIC ADVANCED PO) Take by mouth   Yes Historical Provider, MD   Omega-3 Fatty Acids (FISH OIL) 1200 MG CAPS Take by mouth   Yes Historical Provider, MD   Cholecalciferol (D3 HIGH POTENCY) 50 MCG (2000 UT) CAPS Take by mouth   Yes Historical Provider, MD   ibuprofen (ADVIL;MOTRIN) 400 MG tablet Take 400 mg by mouth every 6 hours as needed  17  Yes Historical Provider, MD       Current medications:    Current Facility-Administered Medications   Medication Dose Route Frequency Provider Last Rate Last Admin    lactated ringers bolus  1,000 mL IntraVENous Once Chidi Hall MD 1,000 mL/hr at 21 1643 1,000 mL at 21 1643    CEFAZOLIN 2000 MG D5W 100 ML IVPB IVPB             meperidine (DEMEROL) injection 12.5 mg  12.5 mg IntraVENous Q5 Min PRN Corky Jorge MD        HYDROmorphone (DILAUDID) injection 0.5 mg  0.5 mg IntraVENous Q10 Min PRN Corky Jorge MD        HYDROmorphone (DILAUDID) injection 0.5 mg  0.5 mg IntraVENous Q5 Min PRN Corky Jorge MD        oxyCODONE-acetaminophen (PERCOCET) 5-325 MG per tablet 1 tablet  1 tablet Oral PRN Carolyn Yoder MD        Or    oxyCODONE-acetaminophen (PERCOCET) 5-325 MG per tablet 2 tablet  2 tablet Oral PRN Carolyn Yoder MD        [facility]etron [facility]) injection 4 mg  4 mg IntraVENous Q30 Min PRN Carolyn Yoder MD        diphenhydrAMINE (BENADRYL) injection 6.25 mg  6.25 mg IntraVENous Once PRN Carolyn Yoder MD        labetalol (NORMODYNE;TRANDATE) injection 5 mg  5 mg IntraVENous Q15 Min PRN Carolyn Yoder MD        hydrALAZINE (APRESOLINE) injection 5 mg  5 mg IntraVENous Q30 Min PRN Carolyn Yoder MD           Allergies:  No Known Allergies    Problem List:    Patient Active Problem List   Diagnosis Code    Acquired breast deformity N64.89    Allergic rhinitis J30.9    HPV test positive [id number]    Micromastia N64.82    Plantar fascia rupture S93.699A    Plantar fascial fibromatosis M72.2    S/P LEEP Z98.890    Acute meniscal tear of left knee S83.207A       Past Medical History:        Diagnosis Date    Arthritis     COVID-19 10/25/2021    PONV (postoperative nausea and vomiting)        Past Surgical History:        Procedure Laterality Date    BREAST ENHANCEMENT SURGERY      BREAST REDUCTION SURGERY       SECTION      KNEE ARTHROSCOPY Bilateral     KNEE ARTHROSCOPY Left 2021    LEFT KNEE ARTHROSCOPIC LATERAL MENISCECTOMY PARTIAL MEDIAL, PARTIAL LATERAL, CHORDROPLASTY MEDIAL LATERAL PATELLOFEMORAL CHORDROPLASTY performed by Dawson Louis MD at [address] ARTHROSCOPY Left 2021    VIDEO ARTHROSCOPY LEFT KNEE, PARTIAL MEDIAL/LATERAL MENISCECTOMY, CHONDROPLASTY, FAT PAD EXCISION performed by Radha Chen MD at [facility]      OTHER SURGICAL HISTORY      BREAST IMPLANT REMOVAL       Social History:    Social History     Tobacco Use    Smoking status: Never Smoker    Smokeless tobacco: Never Used   Substance Use Topics    Alcohol use: Yes     Comment: 0-2 DRINKS PER WEEK Counseling given: Not Answered      Vital Signs (Current):   Vitals:    11/20/21 1644   BP: (!) 135/97   Pulse: 80   Resp: 18   Temp: 98.7 °F (37.1 °C)   SpO2: 98%                                              BP Readings from Last 3 Encounters:   11/20/21 (!) 135/97   11/19/21 124/76   11/19/21 (!) 93/49       NPO Status: Time of last liquid consumption: 1530                        Time of last solid consumption: 1330                        Date of last liquid consumption: 11/20/21                        Date of last solid food consumption: 11/20/21    BMI:   Wt Readings from Last 3 Encounters:   11/19/21 170 lb (77.1 kg)   01/19/21 168 lb 12.8 oz (76.6 kg)   12/18/20 160 lb (72.6 kg)     There is no height or weight on file to calculate BMI.    CBC:   Lab Results   Component Value Date    WBC 8.4 11/20/2021    RBC 4.35 11/20/2021    HGB 14.0 11/20/2021    HCT 41.0 11/20/2021    MCV 94.2 11/20/2021    RDW 14.0 11/20/2021     11/20/2021       CMP: No results found for: NA, K, CL, CO2, BUN, CREATININE, GFRAA, AGRATIO, LABGLOM, GLUCOSE, PROT, CALCIUM, BILITOT, ALKPHOS, AST, ALT    POC Tests: No results for input(s): POCGLU, POCNA, POCK, POCCL, POCBUN, POCHEMO, POCHCT in the last 72 hours. Coags: No results found for: PROTIME, INR, APTT    HCG (If Applicable): No results found for: PREGTESTUR, PREGSERUM, HCG, HCGQUANT     ABGs: No results found for: PHART, PO2ART, BUZ6KVG, TLZ2FOM, BEART, J2JCTQYE     Type & Screen (If Applicable):  No results found for: LABABO, LABRH    Drug/Infectious Status (If Applicable):  No results found for: HIV, HEPCAB    COVID-19 Screening (If Applicable):   Lab Results   Component Value Date    COVID19 Not Detected 11/15/2021    [address] DETECTED 10/25/2021           Anesthesia Evaluation  Patient summary reviewed and Nursing notes reviewed   history of anesthetic complications: PONV.   Airway: Mallampati: II     Neck ROM: full   Dental:          Pulmonary: Cardiovascular:                      Neuro/Psych:               GI/Hepatic/Renal:             Endo/Other:                     Abdominal:             Vascular: Other Findings:             Anesthesia Plan      general     ASA 1 - emergent     (Medications & allergies reviewed  All available lab & EKG data reviewed)  Induction: rapid sequence and intravenous. Anesthetic plan and risks discussed with patient. Plan discussed with CRNA.                   Meri Valdivia MD   11/20/2021

## 2021-11-20 NOTE — PROGRESS NOTES
D: Patient here from [address] [place] [address] stretcher, taken to bay 5 in PACU, all current drips, treatments, skin issues, and plan of of care were reviewed by all RN's, care transferred with patient in stable condition.

## 2021-11-20 NOTE — BRIEF OP NOTE
Brief Postoperative Note      Patient: Bruno Liao  YOB: 1970  MRN: [id number]    Date of Procedure: 11/20/2021    Pre-Op Diagnosis: - Postop bleeding left knee    Post-Op Diagnosis: Same       Procedure(s):  LEFT KNEE ARTHROSCOPY INCISION AND DRAINAGE WITH CLOSURE    Surgeon(s):  Clari Veronica MD    Assistant:  Surgical Assistant: Jameson Powers    Anesthesia: General    Estimated Blood Loss (mL): less than 50     Complications: None    Specimens:   * No specimens in log *    Implants:  * No implants in log *      Drains: * No LDAs found *    Findings: as above    Electronically signed by Clari Veronica MD on 11/20/2021 at 6:11 PM

## 2021-11-20 NOTE — OP NOTE
[address]                                OPERATIVE REPORT    PATIENT NAME: Rabia Blackwood                    :        1970  MED REC NO:   [id number]                          ROOM:  ACCOUNT NO:   [de-identified]                           ADMIT DATE: 2021  PROVIDER:     Lena Bhat MD    DATE OF PROCEDURE:  2021    PREOPERATIVE DIAGNOSES:  Left knee medial meniscus tear; lateral  meniscus tear; grade 3 and 4 chondral changes in the patellofemoral  joint, medial compartment; hypertrophy of the retropatellar fat pad. POSTOPERATIVE DIAGNOSES:  Left knee medial meniscus tear; lateral  meniscus tear; grade 3 and 4 chondral changes in the patellofemoral  joint, medial compartment; hypertrophy of the retropatellar fat pad. OPERATION PERFORMED:  Left knee diagnostic video arthroscopy;  arthroscopic partial medial meniscectomy, partial lateral meniscectomy;  chondroplasty of the patellofemoral joint, medial and lateral  compartments and excision of hypertrophied retropatellar fat pad. PRIMARY SURGEON:  Lena Bhat MD    ANESTHESIA:  General.    COMPLICATIONS:  None apparent. POSTOPERATIVE CONDITION:  To recovery room. EBL: 10 cc    INDICATIONS FOR SURGERY:  The patient is a pleasant 65-year-old female  who has had a long history of multiple surgeries on her left knee. She  had continued stiffness and pain especially in the anterior aspect of  the knee. She had a MRI-proven medial meniscus tear as well as lateral  meniscus tear. She had hypertrophy of the retropatellar fat pad, which  seemed to be where most of her symptoms were.   Although she did have  grade 3 and 4 chondral changes in the patellofemoral joint, medial  compartment and focal cartilage defects in the lateral compartment, we  discussed the options for treatment including total knee replacement,  conservative care as well as arthroscopic surgery. Risks, benefits, and  alternatives of the surgery as well as limitations of the surgery were  clearly discussed especially in the light of preexisting chondral  changes. She agreed with our plan to proceed with arthroscopic surgery. DETAILS OF THE PROCEDURE:  The patient was brought to the operating  room. After administration of general anesthesia, she was placed on the  OR table in a standard arthroscopic position. The left lower extremity  was prepped and draped in a normal sterile fashion. Limb was  exsanguinated. Tourniquet was inflated to 250 mmHg. Standard  two-portal arthroscopy was performed. The following were the findings:    1. PATELLOFEMORAL JOINT:  The knee itself had significant amount of  scar tissue anteriorly. The capsule was significantly hypertrophied  anteriorly. Once into the patellofemoral joint, we saw grade 3 and 4  chondral changes both on the patellar surface as well as on the  trochlea. Any loose cartilage was debrided and chondroplasty was  performed using arthroscopic shaver. There was a large pathologic  medial shelf plica, which was excised along with the anterior fat pad,  which was sequentially removed using arthroscopic shaver as well as an  RF device. Care was taken to avoid injury to the capsular meniscal  junction distally as well as the patellar tendon. We tacked the  retropatellar fat pad from both portals to make sure we had an adequate  resection, but also made sure that we did not destabilize the anterior  horn of the medial and lateral meniscus. 2.  THE MEDIAL COMPARTMENT:  The medial compartment had a posterior horn  medial meniscus tear, which was complex in nature. Partial medial  meniscectomy was performed using a combination of arthroscopic shaver  and RF device sculpting the meniscus tear back to a stable rim.    Unfortunately, the patient had grade 4 chondral changes throughout the  posterior aspect of the medial femoral

## 2021-11-20 NOTE — ED TRIAGE NOTES
Patient to ED from home after ortho surgeon instructed her to come in for bleeding from incision. Patient states that she was doing stretches as instructed and she felt a pop near incision and then it began to bleed. Patient has noted bleeding on dressing but there is no uncontrolled hemorrhage on assessment.

## 2021-11-20 NOTE — H&P
Department of Orthopedic Surgery  Attending   History and Physical        CHIEF COMPLAINT:  Bleeding postop Lt knee scope    Reason for Admission: As Above    History Obtained From:  patient    HISTORY OF PRESENT ILLNESS:      The patient is a 54 y.o. female  who presents with bleeding postop Lt knee scope done yesterday. No fevers or chills. Bent her knee, and significant bleeding. It would not stop. She will be taken back to the OR for I and D, evacuation of hematoma and closure. Past Medical History:        Diagnosis Date    Arthritis     COVID-19 10/25/2021    PONV (postoperative nausea and vomiting)      Past Surgical History:        Procedure Laterality Date    BREAST ENHANCEMENT SURGERY      BREAST REDUCTION SURGERY       SECTION      KNEE ARTHROSCOPY Bilateral     KNEE ARTHROSCOPY Left 2021    LEFT KNEE ARTHROSCOPIC LATERAL MENISCECTOMY PARTIAL MEDIAL, PARTIAL LATERAL, CHORDROPLASTY MEDIAL LATERAL PATELLOFEMORAL CHORDROPLASTY performed by Amanda Ruiz MD at [address] [facility] ARTHROSCOPY Left 2021    VIDEO ARTHROSCOPY LEFT KNEE, PARTIAL MEDIAL/LATERAL MENISCECTOMY, CHONDROPLASTY, FAT PAD EXCISION performed by Edson García MD at [address] [facility]         Medications Prior to Admission:   Prior to Admission medications    Medication Sig Start Date End Date Taking? Authorizing Provider   HYDROcodone-acetaminophen (NORCO) 5-325 MG per tablet Take 1 tablet by mouth every 4 hours as needed for Pain for up to 3 days. Intended supply: 3 days.  Take lowest dose possible to manage pain 21 Yes Edson García MD   Multiple Vitamin (MULTI-VITAMIN DAILY PO) Take by mouth daily   Yes Historical Provider, MD   zinc 50 MG CAPS Take by mouth   Yes Historical Provider, MD   Probiotic Product (PROBIOTIC ADVANCED PO) Take by mouth   Yes Historical Provider, MD   Omega-3 Fatty Acids (FISH OIL) 1200 MG CAPS Take by mouth Yes Historical Provider, MD   Cholecalciferol (D3 HIGH POTENCY) 50 MCG (2000 UT) CAPS Take by mouth   Yes Historical Provider, MD   ibuprofen (ADVIL;MOTRIN) 400 MG tablet Take 400 mg by mouth every 6 hours as needed  12/12/17  Yes Historical Provider, MD       Allergies:  Patient has no known allergies. Social History:    Tobacco:  reports that she has never smoked. She has never used smokeless tobacco.   Alcohol:  reports current alcohol use. Illicit Drug: No  Family History:       Problem Relation Age of Onset    No Known Problems Mother     No Known Problems Father      REVIEW OF SYSTEMS:  CONSTITUTIONAL:  negative  MUSCULOSKELETAL:  positive for  pain    PHYSICAL EXAM:  Admission weight:       VITALS:  BP (!) 135/97   Pulse 80   Temp 98.7 °F (37.1 °C)   Resp 18   LMP 01/12/2008   SpO2 98%     CONSTITUTIONAL:  awake, alert, cooperative, no apparent distress, and appears stated age    MUSCULOSKELETAL:  LEFT KNEE:  incision site bloody drainage present    DATA:  CBC:   Lab Results   Component Value Date    WBC 8.4 11/20/2021    RBC 4.35 11/20/2021    HGB 14.0 11/20/2021    HCT 41.0 11/20/2021    MCV 94.2 11/20/2021    MCH 32.1 11/20/2021    MCHC 34.1 11/20/2021    RDW 14.0 11/20/2021     11/20/2021    MPV 10.0 11/20/2021     BMP:  No results found for: NA, K, CL, CO2, BUN, LABALBU, CREATININE, CALCIUM, GFRAA, LABGLOM, GLUCOSE  PT/INR:  No results found for: PROTIME, INR    Radiology:  No orders to display         ASSESSMENT:     PLAN:  1)  I and D and closure, evacuation of hematoma  2)  IV antibiotics    Risks, benefits and alternatives to surgery explained, all questions answered. Pt agrees with plan.       Wilmer Mnedes MD

## 2021-11-20 NOTE — PROGRESS NOTES
A: Assessment completed and documented, call light is in reach, discussed plan of care with patient who agreed.

## 2021-11-20 NOTE — PROGRESS NOTES
Patient brought from ER to PACU in preparation for OR. Did have a salad at 1330 and coffee at 1530. Consents verified at this time.  Belongings left in PACU, updated friend Ladonna.

## 2021-11-20 NOTE — ED PROVIDER NOTES
CHIEF COMPLAINT  Post-op Problem (bleeding through incision)      HISTORY OF PRESENT ILLNESS  Tayo Parekh  is a 54 y.o. female who presents to the ED at via private vehicle complaining of postoperative bleeding. Patient underwent repair of a meniscal tear in her left knee yesterday with Dr Paola Wesley. This morning around 9:30 AM she change the bandage and was doing some physical therapy when her incision began bleeding. She applied direct pressure and rebandaged it however blood continues to soak through the dressing. Dr Paola Wesley advised her to come to the ER with plans to then take her to the operating room. She does not take blood thinners. There are no other complaints, modifying factors or associated symptoms. Nursing notes reviewed. Past medical history:  has a past medical history of Arthritis, COVID-19 (10/25/2021), and PONV (postoperative nausea and vomiting). Past surgical history:  has a past surgical history that includes Knee arthroscopy (Bilateral); LEEP; Breast enhancement surgery; other surgical history;  section; Knee arthroscopy (Left, 2021); Breast reduction surgery; and Knee arthroscopy (Left, 2021). Home medications:   Prior to Admission medications    Medication Sig Start Date End Date Taking? Authorizing Provider   HYDROcodone-acetaminophen (NORCO) 5-325 MG per tablet Take 1 tablet by mouth every 4 hours as needed for Pain for up to 3 days. Intended supply: 3 days.  Take lowest dose possible to manage pain 21  Nora Morales MD   Multiple Vitamin (MULTI-VITAMIN DAILY PO) Take by mouth daily    Historical Provider, MD   zinc 50 MG CAPS Take by mouth    Historical Provider, MD   Probiotic Product (PROBIOTIC ADVANCED PO) Take by mouth    Historical Provider, MD   Omega-3 Fatty Acids (FISH OIL) 1200 MG CAPS Take by mouth    Historical Provider, MD   Cholecalciferol (D3 HIGH POTENCY) 50 MCG ( UT) CAPS Take by mouth    Historical Provider MD   ibuprofen (ADVIL;MOTRIN) 400 MG tablet Take 400 mg by mouth every 6 hours as needed  12/12/17   Historical Provider, MD       No Known Allergies    Social history:  reports that she has never smoked. She has never used smokeless tobacco. She reports current alcohol use. She reports that she does not use drugs. Family history:    Family History   Problem Relation Age of Onset    No Known Problems Mother     No Known Problems Father        REVIEW OF SYSTEMS  6 systems reviewed, pertinent positives per HPI otherwise noted to be negative    PHYSICAL EXAM  There were no vitals filed for this visit. GENERAL: Patient is well-developed, well-nourished,  no acute distress. no apparent discomfort. Non toxic appearing. HEENT:  Normocephalic, atraumatic. PERRL. Conjunctiva appear normal.  External ears are normal.  MMM  NECK: Supple with normal ROM. Trachea midline  LUNGS:  Normal work of breathing. Speaking comfortably in full sentences. EXTREMITIES: 2+ distal pulses w/o edema. MUSCULOSKELETAL: Left knee with Ace wrap in place with bright red blood soaking through the material anteriorly. No obvious bony deformities. SKIN: Warm/dry. No rashes/lesions noted. PSYCHIATRIC: Patient is alert and oriented with normal affect  NEUROLOGIC: Cranial nerves grossly intact. Moves all extremities with equal strength. No gross sensory deficits. Answers questions/follows commands appropriately. ED COURSE/MDM  Nursing notes reviewed. Patient with postoperative bleeding from her left knee following arthroscopy by Dr. Viktoriya Hassan yesterday. She will be taken to the OR for washout and to achieve hemostasis. Labs ordered but not resulted. Clinical Impression  Based on the presenting complaint, history, and physical exam, multiple diagnoses were considered. Exam and workup here most c/w:  1.  Postoperative hemorrhage of musculoskeletal structure following musculoskeletal procedure        I discussed with Jenny HORTA Anabela Homes the results of evaluation in the ED, diagnosis, care, and prognosis. The plan is to discharge to home. Patient is in agreement with plan and questions have been answered. I also discussed with Julio Monteiro the reasons which may require a return visit and the importance of follow-up care. The patient is well-appearing, nontoxic, and improved at the time of discharge. Patient agrees to call to arrange follow-up care as directed. Julio Monteiro understands to return immediately for worsening/change in symptoms. Patient will be started on the following medications from the ED:  New Prescriptions    No medications on file         Disposition  Pt sent to the OR in stable condition.     Disposition Vitals:  LMP 01/12/2008                  Willem Sanchez MD  11/20/21 0437

## 2021-11-21 NOTE — ANESTHESIA POSTPROCEDURE EVALUATION
Department of Anesthesiology  Postprocedure Note    Patient: Melina Zelaya  MRN: [id number]  YOB: 1970  Date of evaluation: 11/20/2021  Time:  7:11 PM     Procedure Summary     Date: 11/20/21 Room / Location: [place] [address] / [facility]    Anesthesia Start: 0976 Anesthesia Stop: 1032    Procedure: LEFT KNEE ARTHROSCOPY INCISION AND DRAINAGE WITH CLOSURE (Left Knee) Diagnosis: (-)    Surgeons: Anny Cobian MD Responsible Provider: Gilbert Arechiga MD    Anesthesia Type: general ASA Status: 1 - Emergent          Anesthesia Type: general    Kelli Phase I: Kelli Score: 10    Kelli Phase II: Kelli Score: 10    Last vitals: Reviewed and per EMR flowsheets.        Anesthesia Post Evaluation    Comments: Postoperative Anesthesia Note    Name:    Melina Zelaya  MRN:      [id number]    Patient Vitals in the past 12 hrs:  11/20/21 1855, BP:(!) 135/92, Pulse:74, Resp:15, SpO2:99 %  11/20/21 1850, Pulse:71, Resp:15, SpO2:98 %  11/20/21 1845, BP:126/84, Pulse:70, Resp:17, SpO2:98 %  11/20/21 1840, BP:133/80, Pulse:74, Resp:19, SpO2:99 %  11/20/21 1835, BP:(!) 127/90, Pulse:72, Resp:17, SpO2:99 %  11/20/21 1830, BP:(!) 127/95, Pulse:74, Resp:16, SpO2:100 %  11/20/21 1829, BP:(!) 144/118, Pulse:75, Resp:17, SpO2:99 %  11/20/21 1810, BP:(!) 147/94, Temp:96.7 °F (35.9 °C), Temp src:Temporal, Pulse:88, Resp:16, SpO2:99 %  11/20/21 1644, BP:(!) 135/97, Temp:98.7 °F (37.1 °C), Pulse:80, Resp:18, SpO2:98 %     LABS:    CBC  Lab Results       Component                Value               Date/Time                  WBC                      8.4                 11/20/2021 04:45 PM        HGB                      14.0                11/20/2021 04:45 PM        HCT                      41.0                11/20/2021 04:45 PM        PLT                      170                 11/20/2021 04:45 PM   RENAL  Lab Results       Component                Value               Date/Time                  NA 140                 11/20/2021 04:45 PM        K                        3.9                 11/20/2021 04:45 PM        CL                       103                 11/20/2021 04:45 PM        CO2                      26                  11/20/2021 04:45 PM        BUN                      24 (H)              11/20/2021 04:45 PM        CREATININE               0.8                 11/20/2021 04:45 PM        GLUCOSE                  125 (H)             11/20/2021 04:45 PM   COAGS  No results found for: PROTIME, INR, APTT    Intake & Output: In: -   Out: 10     Nausea & Vomiting:  No    Level of Consciousness:  Awake    Pain Assessment:  Adequate analgesia    Anesthesia Complications:  No apparent anesthetic complications    SUMMARY      Vital signs stable  OK to discharge from Stage I post anesthesia care.   Care transferred from Anesthesiology department on discharge from perioperative area

## 2021-11-21 NOTE — PROGRESS NOTES
Discharge instructions reviewed with patient, patient has concerns regarding leaving dressing on until follow up appointment. Stated she \"can't do that because it will build up scar tissue. \"  Addressed concerns, patient appears upset and states \"I'll call him. \" Will review discharge with friend Eldon Oh. Patient also states \"I'm not having anyone stay with me tonight. \" We discussed anesthesia side effects at this time.

## 2021-11-21 NOTE — OP NOTE
[address]                                OPERATIVE REPORT    PATIENT NAME: Joaquim Bains                    :        1970  MED REC NO:   [id number]                          ROOM:       R1  ACCOUNT NO:   [de-identified]                           ADMIT DATE: 2021  PROVIDER:     Abeba Jaffe MD    DATE OF PROCEDURE:  2021    PREOPERATIVE DIAGNOSES:  Wound dehiscence and postoperative bleeding,  status post left knee arthroscopy. POSTOPERATIVE DIAGNOSES:  Wound dehiscence and postoperative bleeding,  status post left knee arthroscopy. OPERATION PERFORMED:  Arthroscopic I and D and primary closure of wound  dehiscence and left knee postoperative bleeding. SURGEON:  Abeba Jaffe MD    ANESTHESIA:  General.    ESTIMATED BLOOD LOSS:  Less than 25 mL. COMPLICATIONS:  None apparent. POSTOPERATIVE CONDITION:  To recovery room. SPECIMENS:  None. INDICATIONS FOR SURGERY:  The patient is a very pleasant 73-year-old  female, who had a left knee arthroscopy performed yesterday by myself  for partial medial lateral meniscectomy, chondroplasty, tricompartmental  as well as excision of retropatellar fat pad. She was doing well. She  took her dressing off earlier today, bent her knee and had a geyser of  blood pouring out of the portals. She was instructed to keep her knee  straight. She placed a compressing dressing on this, but there was  persistent bleeding through the dressing. She was then brought to the  operating room for operative I and D and closure. Risks, benefits,  alternatives of surgery were clearly discussed with the patient. The  patient wished to proceed with surgery. OPERATIVE FINDINGS:  The 4-0 Monocryl sutures had pulled through the  skin and there was bleeding through the portal through the Steri-Strips.   There was no evidence of active bleeding or infection. DETAILS OF THE PROCEDURE:  The patient was brought to the operative room  and after administration of general anesthesia, she was placed on the OR  table in the supine position. Left lower extremity prepped and draped  in normal sterile fashion. Limb was exsanguinated. Tourniquet was  inflated to 250 mmHg. The previous sutures were pulled through the  subcutaneous tissue, these were removed. The arthroscope was introduced  into the joint. We irrigated the joint out with 6 liters of irrigation  solution with Ancef within this solution. There was no evidence of  active infection. There was no significant bleeding going on even after  the tourniquet was let down. We then closed the subcutaneous tissue  with combination of 4-0 Monocryl as well as 3-0 nylon to close the skin. Dry sterile compression dressing was applied. We did place 10 mL of 1%  lidocaine with epinephrine to help with bleeding control as well as 10  mL of 0.25% Marcaine plain. This was placed around the portals and in  the anterior fat pad area. A dry sterile compression dressing was  applied. The patient was then taken to recovery room in stable  condition having tolerated the procedure well.         Sims Galeazzi, MD    D: 11/20/2021 18:22:52       T: 11/21/2021 0:27:16     [id number]  Job#: [id number]     Doc#: [id number]    CC:

## 2023-01-27 ENCOUNTER — HOSPITAL ENCOUNTER (OUTPATIENT)
Dept: MAMMOGRAPHY | Age: 53
Discharge: HOME | End: 2023-01-27
Payer: COMMERCIAL

## 2023-01-27 VITALS — HEIGHT: 66 IN | BODY MASS INDEX: 27.32 KG/M2 | WEIGHT: 170 LBS

## 2023-01-27 DIAGNOSIS — Z12.31 VISIT FOR SCREENING MAMMOGRAM: ICD-10-CM

## 2023-01-27 PROCEDURE — 77067 SCR MAMMO BI INCL CAD: CPT

## (undated) DEVICE — AMBIENT SUPER MULTIVAC 50 WITH                                    INTEGRATED FINGER SWITCHES IFS: Brand: COBLATION

## (undated) DEVICE — 3M™ STERI-STRIP™ REINFORCED ADHESIVE SKIN CLOSURES, R1547, 1/2 IN X 4 IN (12 MM X 100 MM), 6 STRIPS/ENVELOPE: Brand: 3M™ STERI-STRIP™

## (undated) DEVICE — SUTURE NONABSORBABLE MONOFILAMENT 3-0 PS-1 18 IN BLK ETHILON 1663H

## (undated) DEVICE — LOTION PREP REMV 5OZ IODO CLR TINC OF BENZ DURAPREP

## (undated) DEVICE — Device

## (undated) DEVICE — GLOVE SURG SZ 65 L12IN FNGR THK94MIL STD WHT LTX FREE

## (undated) DEVICE — GLOVE 8 LTX ST TRIFLEX POWDER LK

## (undated) DEVICE — GAUZE,SPONGE,4"X4",8PLY,STRL,LF,10/TRAY: Brand: MEDLINE

## (undated) DEVICE — SOLUTION IV 1000ML LAC RINGERS PH 6.5 INJ USP VIAFLX PLAS

## (undated) DEVICE — DYONICS 25 PATIENT TUBE SET MUST                                    BE USED WITH 7211007, 12 PER BOX

## (undated) DEVICE — GLOVE SURG SZ 85 L12IN FNGR THK94MIL STD WHT LTX FREE

## (undated) DEVICE — ZIMMER® STERILE DISPOSABLE TOURNIQUET CUFF WITH PLC, DUAL PORT, SINGLE BLADDER, 30 IN. (76 CM)

## (undated) DEVICE — GLOVE SURG SZ 65 L12IN FNGR THK79MIL GRN LTX FREE

## (undated) DEVICE — SUTURE COAT VCRL SZ 4-0 L18IN ABSRB UD L19MM PS-2 1/2 CIR J496G

## (undated) DEVICE — CATHETER IV 20GA L1.25IN PNK FEP SFTY STR HUB RADPQ DISP

## (undated) DEVICE — SUTURE VCRL UD BR CT 3-0 18IN CT1 J838D

## (undated) DEVICE — BANDAGE COMPR W6INXL5YD HI E BGE W/ CLP SURE-WRAP

## (undated) DEVICE — DRAPE ARTHRO 90X124IN KNEE SMS FAB EXT FLD COLL PCH RESIST

## (undated) DEVICE — TUBING PMP L8FT LNG W/ CONN FOR AR-6400 REDEUCE

## (undated) DEVICE — SUTURE MCRYL + SZ 4-0 L18IN ABSRB UD L19MM PS-2 3/8 CIR MCP496G

## (undated) DEVICE — DRAPE 54X23IN MAYO STAND COVER REINF

## (undated) DEVICE — DRESSING,GAUZE,PETROLATUM,CURAD,3"X9",ST: Brand: CURAD

## (undated) DEVICE — TUBE IRRIG L8IN LNG PT W/ CONN FOR PMP SYS REDEUCE

## (undated) DEVICE — SET GRAV VENT NVENT CK VLV 3 NDL FREE PRT 10 GTT

## (undated) DEVICE — PACK PROCEDURE SURG ARTHSCP CUST

## (undated) DEVICE — GLOVE ORANGE PI 8 1/2   MSG9085

## (undated) DEVICE — SET ADMIN PRIMING 7ML L30IN 7.35LB 20 GTT 2ND RLER CLMP

## (undated) DEVICE — 3M™ TEGADERM™ TRANSPARENT FILM DRESSING FRAME STYLE, 1624W, 2-3/8 IN X 2-3/4 IN (6 CM X 7 CM), 100/CT 4CT/CASE: Brand: 3M™ TEGADERM™

## (undated) DEVICE — STERILE LATEX POWDER-FREE SURGICAL GLOVESWITH NITRILE COATING: Brand: PROTEXIS

## (undated) DEVICE — SUTURE MCRYL SZ 4-0 L18IN ABSRB UD L19MM PS-2 3/8 CIR PRIM Y496G

## (undated) DEVICE — DYONICS 25 DAY TUBE SET MUST BE                                    USED WITH 7211008, 3 PER BOX

## (undated) DEVICE — APPLICATOR PREP 26ML 0.7% IOD POVACRYLEX 74% ISO ALC ST

## (undated) DEVICE — 3M™ STERI-STRIP™ COMPOUND BENZOIN TINCTURE 40 BAGS/CARTON 4 CARTONS/CASE C1544: Brand: 3M™ STERI-STRIP™

## (undated) DEVICE — GAUZE,SPONGE,4"X4",16PLY,STRL,LF,10/TRAY: Brand: MEDLINE

## (undated) DEVICE — PAD,ABDOMINAL,8"X10",ST,LF: Brand: MEDLINE

## (undated) DEVICE — COVER LT HNDL BLU PLAS

## (undated) DEVICE — CHLORAPREP 26ML ORANGE

## (undated) DEVICE — 3.5 MM INCISOR STRAIGHT BLADES,                                    POWER/EP-1, MEDIUM GRAY, PACKAGED 6                                    PER BOX, STERILE

## (undated) DEVICE — BLANKET WRM W29.9XL79.1IN UP BODY FORC AIR MISTRAL-AIR

## (undated) DEVICE — GLOVE SURG SZ 8 L11.77IN FNGR THK9.8MIL STRW LTX POLYMER

## (undated) DEVICE — WEREWOLF FLOW 50 COBLATION WAND: Brand: COBLATION

## (undated) DEVICE — MERCY FAIRFIELD TURNOVER KIT: Brand: MEDLINE INDUSTRIES, INC.

## (undated) DEVICE — PADDING CAST W4INXL4YD NONSTERILE COT RAYON MICROPLEATED

## (undated) DEVICE — SOLUTION IRRIG 5L LAC R BG

## (undated) DEVICE — [TOMCAT CUTTER, ARTHROSCOPIC SHAVER BLADE,  DO NOT RESTERILIZE,  DO NOT USE IF PACKAGE IS DAMAGED,  KEEP DRY,  KEEP AWAY FROM SUNLIGHT]: Brand: FORMULA

## (undated) DEVICE — 4-PORT MANIFOLD: Brand: NEPTUNE 2

## (undated) DEVICE — GLOVE SURG SZ 8 L12IN FNGR THK94MIL STD WHT LTX FREE

## (undated) DEVICE — 4.5 MM INCISOR PLUS STRAIGHT                                    BLADES, POWER/EP-1, VIOLET, PACKAGED                                    6 PER BOX, STERILE

## (undated) DEVICE — GLOVE SURG SZ 75 L12IN FNGR THK94MIL STD WHT LTX FREE